# Patient Record
Sex: FEMALE | Race: WHITE
[De-identification: names, ages, dates, MRNs, and addresses within clinical notes are randomized per-mention and may not be internally consistent; named-entity substitution may affect disease eponyms.]

---

## 2021-04-15 DIAGNOSIS — Z23 ENCOUNTER FOR IMMUNIZATION: ICD-10-CM

## 2024-08-20 ENCOUNTER — HOSPITAL ENCOUNTER (INPATIENT)
Dept: HOSPITAL 99 - CVICU | Age: 64
LOS: 12 days | Discharge: HOME | DRG: 219 | End: 2024-09-01
Payer: COMMERCIAL

## 2024-08-20 VITALS
BODY MASS INDEX: 37.7 KG/M2 | BODY MASS INDEX: 38.2 KG/M2 | BODY MASS INDEX: 40 KG/M2 | BODY MASS INDEX: 39.4 KG/M2 | BODY MASS INDEX: 38.4 KG/M2 | BODY MASS INDEX: 37.8 KG/M2 | BODY MASS INDEX: 39.3 KG/M2 | BODY MASS INDEX: 38 KG/M2 | BODY MASS INDEX: 39.2 KG/M2 | BODY MASS INDEX: 37.9 KG/M2

## 2024-08-20 VITALS — SYSTOLIC BLOOD PRESSURE: 156 MMHG | DIASTOLIC BLOOD PRESSURE: 68 MMHG

## 2024-08-20 VITALS — RESPIRATION RATE: 20 BRPM

## 2024-08-20 VITALS — SYSTOLIC BLOOD PRESSURE: 152 MMHG | DIASTOLIC BLOOD PRESSURE: 64 MMHG

## 2024-08-20 VITALS — RESPIRATION RATE: 18 BRPM

## 2024-08-20 DIAGNOSIS — Z79.4: ICD-10-CM

## 2024-08-20 DIAGNOSIS — T82.855A: ICD-10-CM

## 2024-08-20 DIAGNOSIS — E78.00: ICD-10-CM

## 2024-08-20 DIAGNOSIS — I48.0: ICD-10-CM

## 2024-08-20 DIAGNOSIS — I50.33: ICD-10-CM

## 2024-08-20 DIAGNOSIS — D62: ICD-10-CM

## 2024-08-20 DIAGNOSIS — Z82.49: ICD-10-CM

## 2024-08-20 DIAGNOSIS — Z96.41: ICD-10-CM

## 2024-08-20 DIAGNOSIS — J98.11: ICD-10-CM

## 2024-08-20 DIAGNOSIS — E86.1: ICD-10-CM

## 2024-08-20 DIAGNOSIS — I11.0: ICD-10-CM

## 2024-08-20 DIAGNOSIS — E10.40: ICD-10-CM
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
## 2024-08-20 DIAGNOSIS — N39.0: ICD-10-CM

## 2024-08-20 DIAGNOSIS — Z87.891: ICD-10-CM

## 2024-08-20 DIAGNOSIS — G47.33: ICD-10-CM

## 2024-08-20 DIAGNOSIS — Z79.02: ICD-10-CM

## 2024-08-20 DIAGNOSIS — Z95.5: ICD-10-CM

## 2024-08-20 DIAGNOSIS — I25.110: Primary | ICD-10-CM

## 2024-08-20 DIAGNOSIS — I08.0: ICD-10-CM

## 2024-08-20 DIAGNOSIS — J93.83: ICD-10-CM

## 2024-08-20 DIAGNOSIS — D69.59: ICD-10-CM

## 2024-08-20 DIAGNOSIS — I25.5: ICD-10-CM

## 2024-08-20 DIAGNOSIS — I27.20: ICD-10-CM

## 2024-08-20 DIAGNOSIS — E66.01: ICD-10-CM

## 2024-08-20 DIAGNOSIS — I25.2: ICD-10-CM

## 2024-08-20 DIAGNOSIS — Z98.84: ICD-10-CM

## 2024-08-20 DIAGNOSIS — E10.21: ICD-10-CM

## 2024-08-20 DIAGNOSIS — B96.20: ICD-10-CM

## 2024-08-20 DIAGNOSIS — Z79.899: ICD-10-CM

## 2024-08-20 DIAGNOSIS — N17.9: ICD-10-CM

## 2024-08-20 PROCEDURE — P9047 ALBUMIN (HUMAN), 25%, 50ML: HCPCS

## 2024-08-20 PROCEDURE — 88311 DECALCIFY TISSUE: CPT

## 2024-08-20 PROCEDURE — P9016 RBC LEUKOCYTES REDUCED: HCPCS

## 2024-08-20 PROCEDURE — P9073 PLATELETS PHERESIS PATH REDU: HCPCS

## 2024-08-20 PROCEDURE — 88305 TISSUE EXAM BY PATHOLOGIST: CPT

## 2024-08-20 PROCEDURE — P9045 ALBUMIN (HUMAN), 5%, 250 ML: HCPCS

## 2024-08-20 RX ADMIN — GABAPENTIN 800 MG: 400 CAPSULE ORAL at 23:43

## 2024-08-21 VITALS — RESPIRATION RATE: 16 BRPM

## 2024-08-21 VITALS — SYSTOLIC BLOOD PRESSURE: 138 MMHG | RESPIRATION RATE: 18 BRPM | DIASTOLIC BLOOD PRESSURE: 51 MMHG

## 2024-08-21 VITALS — SYSTOLIC BLOOD PRESSURE: 149 MMHG | DIASTOLIC BLOOD PRESSURE: 46 MMHG

## 2024-08-21 VITALS — SYSTOLIC BLOOD PRESSURE: 111 MMHG | DIASTOLIC BLOOD PRESSURE: 86 MMHG

## 2024-08-21 VITALS — DIASTOLIC BLOOD PRESSURE: 51 MMHG | SYSTOLIC BLOOD PRESSURE: 139 MMHG

## 2024-08-21 VITALS — DIASTOLIC BLOOD PRESSURE: 47 MMHG | SYSTOLIC BLOOD PRESSURE: 123 MMHG

## 2024-08-21 VITALS — DIASTOLIC BLOOD PRESSURE: 60 MMHG | SYSTOLIC BLOOD PRESSURE: 114 MMHG

## 2024-08-21 VITALS — RESPIRATION RATE: 20 BRPM

## 2024-08-21 VITALS — SYSTOLIC BLOOD PRESSURE: 144 MMHG | DIASTOLIC BLOOD PRESSURE: 62 MMHG

## 2024-08-21 VITALS — RESPIRATION RATE: 18 BRPM

## 2024-08-21 VITALS — DIASTOLIC BLOOD PRESSURE: 49 MMHG | SYSTOLIC BLOOD PRESSURE: 141 MMHG

## 2024-08-21 VITALS — SYSTOLIC BLOOD PRESSURE: 123 MMHG | DIASTOLIC BLOOD PRESSURE: 47 MMHG

## 2024-08-21 VITALS — SYSTOLIC BLOOD PRESSURE: 140 MMHG | DIASTOLIC BLOOD PRESSURE: 60 MMHG

## 2024-08-21 LAB
ALBUMIN SERPL-MCNC: 3.2 G/DL (ref 3.5–5)
ALP SERPL-CCNC: 109 U/L (ref 38–126)
ALT SERPL-CCNC: 29 U/L (ref 0–35)
APTT PPP: 28.2 SEC (ref 23.4–35)
APTT PPP: 32 SEC (ref 23.4–35)
APTT PPP: 61.6 SEC (ref 23.4–35)
AST SERPL-CCNC: 35 U/L (ref 14–36)
BUN SERPL-MCNC: 15 MG/DL (ref 7–17)
CALCIUM SERPL-MCNC: 9.2 MG/DL (ref 8.4–10.2)
CHLORIDE SERPL-SCNC: 107 MMOL/L (ref 98–107)
CO2 SERPL-SCNC: 25 MMOL/L (ref 22–30)
EGFR: > 60
ERYTHROCYTE [DISTWIDTH] IN BLOOD BY AUTOMATED COUNT: 14.1 % (ref 11.5–14.5)
ERYTHROCYTE [DISTWIDTH] IN BLOOD BY AUTOMATED COUNT: 14.2 % (ref 11.5–14.5)
ESTIMATED CREATININE CLEARANCE: 82 ML/MIN
GLUCOSE - POINT OF CARE: 173 MG/DL (ref 70–99)
GLUCOSE - POINT OF CARE: 193 MG/DL (ref 70–99)
GLUCOSE SERPL-MCNC: 130 MG/DL (ref 70–99)
HBA1C MFR BLD: 7 % (ref 4–5.6)
HCT VFR BLD AUTO: 29.4 % (ref 37–47)
HCT VFR BLD AUTO: 32.6 % (ref 37–47)
HDLC SERPL-MCNC: 33 MG/DL
HGB BLD-MCNC: 10 G/DL (ref 12–16)
HGB BLD-MCNC: 11.3 G/DL (ref 12–16)
INR PPP: 1.06
LDLC SERPL CALC-MCNC: 31 MG/DL
MAGNESIUM SERPL-MCNC: 1.8 MG/DL (ref 1.6–2.3)
MCHC RBC AUTO-ENTMCNC: 34 G/DL (ref 33–37)
MCHC RBC AUTO-ENTMCNC: 34.7 G/DL (ref 33–37)
MCV RBC AUTO: 86.2 FL (ref 81–99)
MCV RBC AUTO: 86.2 FL (ref 81–99)
PLATELET # BLD AUTO: 169 10^3/UL (ref 130–400)
PLATELET # BLD AUTO: 172 10^3/UL (ref 130–400)
POTASSIUM SERPL-SCNC: 3.9 MMOL/L (ref 3.5–5.1)
PROT SERPL-MCNC: 5.3 G/DL (ref 6.3–8.2)
PROTHROMBIN TIME: 13.6 SEC (ref 11.4–14.6)
SODIUM SERPL-SCNC: 137 MMOL/L (ref 135–145)
URINE RED BLOOD CELL: (no result) /HPF (ref 0–2)
URINE WHITE CELL: (no result) /HPF (ref 0–5)
VLDLC SERPL CALC-MCNC: 13 MG/DL (ref 0–30)

## 2024-08-21 RX ADMIN — SOTALOL HYDROCHLORIDE 40 MG: 80 TABLET ORAL at 21:23

## 2024-08-21 RX ADMIN — SOTALOL HYDROCHLORIDE 40 MG: 80 TABLET ORAL at 09:03

## 2024-08-21 RX ADMIN — ACETAMINOPHEN 650 MG: 325 TABLET ORAL at 17:04

## 2024-08-21 RX ADMIN — EZETIMIBE 10 MG: 10 TABLET ORAL at 17:06

## 2024-08-21 RX ADMIN — GABAPENTIN 900 MG: 300 CAPSULE ORAL at 21:23

## 2024-08-21 RX ADMIN — PANTOPRAZOLE SODIUM: 40 TABLET, DELAYED RELEASE ORAL at 09:06

## 2024-08-21 RX ADMIN — ISOSORBIDE MONONITRATE 60 MG: 60 TABLET, EXTENDED RELEASE ORAL at 09:05

## 2024-08-21 RX ADMIN — HEPARIN SODIUM 250: 10000 INJECTION, SOLUTION INTRAVENOUS at 16:26

## 2024-08-21 RX ADMIN — ATORVASTATIN CALCIUM 40 MG: 40 TABLET, FILM COATED ORAL at 17:06

## 2024-08-21 RX ADMIN — Medication 50 MCG: at 09:05

## 2024-08-21 RX ADMIN — ASPIRIN 81 MG: 81 TABLET, COATED ORAL at 09:03

## 2024-08-21 RX ADMIN — HYDRALAZINE HYDROCHLORIDE 5 MG: 20 INJECTION INTRAMUSCULAR; INTRAVENOUS at 00:49

## 2024-08-21 RX ADMIN — GABAPENTIN 900 MG: 300 CAPSULE ORAL at 00:48

## 2024-08-21 NOTE — PTCARENOTE
"Pt. received as transfer from [facility] into room 9654.  EMS reported that pt. complained of CP 3/10 during transport and recieved 4 baby aspirin and 1 SL nitro.  EKG obtained in ambulance read NSR.  Upon arrival, pt. reported CP had resolved.  "~"Patient placed on tele monitor, reading NSR/sinus james with HR 50s-60s. Admission completed and pt oriented to room.  Pt. ambulating independently in room without difficulty.  Plan of care discussed and pt. verbalizes understanding.  Can make needs "~"known.  Call bell within reach."

## 2024-08-21 NOTE — CM
"Reviewed chart. Met with Mrs. Givens to review discharge plans.  She states prior to admission she resides with her spouse in a spilt level home without any steps to enter. She states she has four steps to get to to the main living area and then ten "~"steps to get to bedroom/full bathroom.  She states prior to admission she was independent with ambulation and adls.  She states she does not have any DME in the home. She states she has a prescription plan. She states her spouse will be home to "~"assist in her care if needed.  Medical work-up in progress.  The discharge plan is to return home with her spouse when medically stable. "

## 2024-08-21 NOTE — PN.DE.MGMTRT
"Insulin Management"~"- -"~"-: "~"8/21/2024: Diabetes Management Consult"~"65 y/o pleasant woman who was transferred from [facility] with hx of MI/CAD S/P PCI stent to LAD in June 2024. Pt was in Cardiac rehab @ [facility] on Friday 8/16/24 when she developed 10/10 substernal chest pain with radiation to the neck/jaw--&gt; Cardiac cath--&gt; "~"extensive LAD disease--&gt; refereed to  for CABG."~"PMH: HTN, CAD/MI s/p PCI with VANESA to LAD, PAF S/P ablation, 2021, PSVT S/P ablation 2021, HLD, IDDMx 45 years and using the insulin pump x35 yrs, Diabetic neuropathy and Class 2 obesity (BMI 37.6). Routinely sees Endo @ [facility] Dr. Johnson"~"Currently using Medtronic 780 G Insulin pump with Guardian Sensor and NovoLog insulin."~"Pt awake, A/O x3, offers no complaints, able to discuss diabetes management. "~"Current glucose via sensor is 116. Pt has placed her lunch order and is waiting for her tray"~"Was notified by CV team that Pt was very reluctant about getting accucheks done."~"Upon discussion, pt became very emotional, stating that she has been through a lot these past 4 days and wants to have some type of control over her life."~"Gently explained to pt the process of using her own pump while here in the hospital, reviewed bedside worksheet and need for Nursing staff to check her blood sugars ACHS. "~"Current pump settings"~"Basal 			ICR		ISF 12am - 12am 1:40"~"12am - 2am  1.80		12am - 7am   6"~"2am - 8pm    1.60		7am - 5pm     5	Target 90to 110"~"8pm - 12am   1.75  		5pm - 12am    5"~"24 hour total 39.4 units"~"Explained to pt that in the event that she has been schedule for CABG, the insulin pump will be discontinued the morning of surgery and her glucose will be managed using the continuous insulin drip x48 hrs after surgery and she was agreeable."~"Pt states that she does not use her basal settings when pump is in auto mode and that she only corrects and boluses at meal times"~"Will change die to 1600cal, pt is only 5'1"~"Diabetes History"~"- -"~"Type of Diabetes: 2 requiring insulin"~"-: "~"Pre-Admission Diabetes Regimen"~" 	08/21/24"~" 	05:47"~"Creatinine	 0.7"~"Lab Results"~"Hemoglobin A1c	 7.0 % (4.0-5.6)  H 	08/21/24  05:47    "~"Insulin Pump Settings"~"IP Diabetes Regimen"~" 	08/21/24"~" 	05:47"~"Glucose	 130 H"~"Meal type:                    	Breakfast                                       	"~"Meal type:                    	Breakfast                                       	"~"Amount consumed:              	100%                                            	"~"Amount consumed:              	100%                                            	"~"Patient Education"

## 2024-08-21 NOTE — DOWNTIME
"There was a [employer] Client  Downtime on 8/21/2024 from 0100 to 8/21/2024 at 0252. Downtime documentation of patient's care, including medication administrations, has been reconciled in the electronic record per guidelines. Refer to the "~"patient's paper chart under the miscellaneous tab to see printed paper medication records and downtime forms."

## 2024-08-21 NOTE — HPS.HSE
"Addendum entered and electronically signed by DENNYS Mahajan  08/22/24 11:08: "~"Past Surgical History:"~"Gastric bypass ([place]-en-Y)"~"Original Note:"~"Family Physician"~"-"~"-: "~"Family Physician:  Gama Hi"~"Chief Complaint"~"-"~"-: "~"Chest pain"~"History of Present Illness"~"-: "~"63 y/o pleasant woman who was transferred from [facility] with hx of MI/CAD S/P PCI stent to LAD in June 2024. Pt was in Cardiac rehab @ [facility] on Friday 8/16/24 when she developed 10/10 substernal chest pain with radiation to the neck/jaw. Denies associated "~"SOB/diaphoresis, N/V. States she received SL NTG x3 without relief and was referred to [facility]. States pain finally resolved after receiving morphine in the ED.  States troponin and EKG were unrevealing, but given her hx of CAD it was felt prudent "~"that she received a cath. Left heart catheterization on Monday 8/18/24 revealed critical and extensively calcified mid left anterior descending as culprit vessel. Pt also had an echocardiogram which showed a moderate AS and moderately elevated "~"pulmonary artery pressure, EF 65-70%. Pt was referred to  for evaluation of CABG (MIDCAB). Of note, pt is on Plavix for previous stent, last dose was Monday 8/18/2024. "~"Medical History"~"Past Medical History"~"Past Medical History: Reports Other"~"Additional Past Medical History: "~"-Hx MI/CAD S/P PCI with VANESA to LAD"~"-[place]"~"-T2DM since age of 19, on insulin with hgb A1C of  7.2         "~"-Diabetic neuropathy       "~"-PAF S/P ablation, 2021"~"-PSVT S/P ablation, 2021"~"-HTN"~"-HLD"~"-Class 2 obesity (BMI 37.6)"~"Past Surgical History: Reports Other"~"Additional Past Surgical History: "~"-S/P b/l cataracts"~"-S/p Repair of retinal detachment, 2021"~"-S/P b/l carpal tunnel "~"-S/p Cholecystectomy "~"-S/P Appendectomy"~"-S/P Left TKA "~"-S/p Release of trigger finger "~"Social History"~"Unable to obtain full social history at this time due to: Other"~"Tobacco: Smoker (quit 35 years ago)"~"Alcohol: Occasional"~"Drug: None"~"Personal:  (with 2 children)"~"Living: With Family"~"Employment: Disabled (x 20 yrs)"~"Family History"~"Family History: CAD (father had CABG is living @ age of 101) and Other"~"Allergies / Home Medications"~"-: "~"Allergies reflects when Allergies were last updated in [employer]."~"Home Medications with original date entered in [employer] "~"Allergy/Medication List: "~"Adhesive tape (hives)"~"Fish oil (unknown)"~"Review of Systems"~"-"~"Unable to obtain full review of systems at this time due to: Other"~"A 12 point ROS was completed and negative except as noted: Yes"~"Cardiac: Reports Chest Pain"~"Physical Exam"~"Vital Signs"~"-: "~"Vital Signs"~"Temp	Pulse	Resp	BP	Pulse Ox"~" 97.8 F 	 61 	 18 	 138/51 	 95 "~" 08/21/24 05:50	 08/21/24 05:50	 08/21/24 05:50	 08/21/24 05:50	 08/21/24 05:50"~"Physical Exam"~"General: Well Developed and Well Nourished"~"HEENT: NormoCephalic, Moist mucous membranes, Atraumatic, Pink Conjunctivae and No Ptosis"~"Respiratory: Clear"~"Cardiac: S1/S2 and Regular Rhythm"~"Breast: Deferred by me"~"GI: Soft, Non Tender, Non Distended and Normal Bowel Sounds"~"Rectal: Deferred by Provider"~"Genito-urinary: Deferred by me"~"Skin: Warm"~"Neuro: Awake, Alert, Oriented and AO x 3"~"Psych: Calm"~"Laboratory Results"~"-"~"-: "~"08/21/24 05:47 "~"08/21/24 05:47 "~"Laboratory Results"~"PT	 13.6 Sec (11.4-14.6)  	08/21/24  05:47    "~"INR	 1.06  	08/21/24  05:47    "~"APTT	 32.0 Sec (23.4-35.0)  	08/21/24  05:47    "~"Total Bilirubin	 0.8 mg/dl (0.2-1.3)  	08/21/24  05:47    "~"AST	 35 U/L (14-36)  	08/21/24  05:47    "~"ALT	 29 U/L (0-35)  	08/21/24  05:47    "~"Alkaline Phosphatase	 109 U/L ()  	08/21/24  05:47    "~"Data Reviewed"~"-"~"Diagnostic Radiology: Image Personally Visualized and interpreted and Report Reviewed by me"~"Lab Data: Labs Reviewed by me"~"Old Records: Requested and Reviewed"~"Impression/Plan"~"-"~"-: "~"IMPRESSION:"~"63 y/o pleasant woman with severe LAD disease referred from [facility]  for CABG evaluation. Of note pt's echo showed moderate AS, and was administered Plavix on 8/18/24"~"PLAN: "~"-CABG evaluation"~"-Dr. Gunter to review imaging and see pt"~"-Pt will need Plavix washout"

## 2024-08-21 NOTE — PTCARENOTE
"Assumed care of pt from prev nsg shift; Pt had just arrived back on the floor from having testing completed. Pt AAOx3 w/no c/o CP or SOB. Pt able to ambulate unassisted from stretcher to chair. This RN discussed pt's insulin pump & checking glucose "~"levels. Pt unhappy with the need for fingerstick glucose checks when 'her meter keeps track'. Pt also doesn't keep track of her insulin dosing because 'the meter and the pump take care of that for me'. This RN spoke w/CT surgery PA & rec'd orders "~"for pt's own insulin pump & the diabetic educator to see pt today.  Discussed w/pt & pt willing to speak w/educator. Pt's VS stable w/HR in the 60's & /51. Pt is SR on telemetry monitoring. Plan of care ongoing."

## 2024-08-22 VITALS — SYSTOLIC BLOOD PRESSURE: 119 MMHG | DIASTOLIC BLOOD PRESSURE: 57 MMHG

## 2024-08-22 VITALS — SYSTOLIC BLOOD PRESSURE: 127 MMHG | DIASTOLIC BLOOD PRESSURE: 47 MMHG

## 2024-08-22 VITALS — RESPIRATION RATE: 18 BRPM

## 2024-08-22 VITALS — SYSTOLIC BLOOD PRESSURE: 118 MMHG | DIASTOLIC BLOOD PRESSURE: 47 MMHG

## 2024-08-22 VITALS — SYSTOLIC BLOOD PRESSURE: 113 MMHG | DIASTOLIC BLOOD PRESSURE: 51 MMHG

## 2024-08-22 VITALS — DIASTOLIC BLOOD PRESSURE: 45 MMHG | SYSTOLIC BLOOD PRESSURE: 131 MMHG

## 2024-08-22 VITALS — SYSTOLIC BLOOD PRESSURE: 147 MMHG | DIASTOLIC BLOOD PRESSURE: 58 MMHG

## 2024-08-22 LAB
APTT PPP: 74 SEC (ref 23.4–35)
APTT PPP: 90.2 SEC (ref 23.4–35)
ERYTHROCYTE [DISTWIDTH] IN BLOOD BY AUTOMATED COUNT: 14.2 % (ref 11.5–14.5)
GLUCOSE - POINT OF CARE: 121 MG/DL (ref 70–99)
GLUCOSE - POINT OF CARE: 134 MG/DL (ref 70–99)
GLUCOSE - POINT OF CARE: 135 MG/DL (ref 70–99)
GLUCOSE - POINT OF CARE: 144 MG/DL (ref 70–99)
GLUCOSE - POINT OF CARE: 181 MG/DL (ref 70–99)
HCT VFR BLD AUTO: 32 % (ref 37–47)
HDLC SERPL-MCNC: 38 MG/DL
HGB BLD-MCNC: 10.8 G/DL (ref 12–16)
LDLC SERPL CALC-MCNC: 35 MG/DL
MCHC RBC AUTO-ENTMCNC: 33.8 G/DL (ref 33–37)
MCV RBC AUTO: 88.9 FL (ref 81–99)
PLATELET # BLD AUTO: 165 10^3/UL (ref 130–400)
VLDLC SERPL CALC-MCNC: 11 MG/DL (ref 0–30)

## 2024-08-22 RX ADMIN — Medication 50 MCG: at 08:32

## 2024-08-22 RX ADMIN — ATORVASTATIN CALCIUM 40 MG: 40 TABLET, FILM COATED ORAL at 18:35

## 2024-08-22 RX ADMIN — EZETIMIBE 10 MG: 10 TABLET ORAL at 18:35

## 2024-08-22 RX ADMIN — SOTALOL HYDROCHLORIDE 40 MG: 80 TABLET ORAL at 19:41

## 2024-08-22 RX ADMIN — GABAPENTIN 900 MG: 300 CAPSULE ORAL at 22:36

## 2024-08-22 RX ADMIN — HEPARIN SODIUM 250: 10000 INJECTION, SOLUTION INTRAVENOUS at 13:44

## 2024-08-22 RX ADMIN — ASPIRIN 81 MG: 81 TABLET, COATED ORAL at 08:32

## 2024-08-22 RX ADMIN — SOTALOL HYDROCHLORIDE 40 MG: 80 TABLET ORAL at 08:31

## 2024-08-22 RX ADMIN — ISOSORBIDE MONONITRATE 60 MG: 60 TABLET, EXTENDED RELEASE ORAL at 08:32

## 2024-08-22 RX ADMIN — PANTOPRAZOLE SODIUM: 40 TABLET, DELAYED RELEASE ORAL at 08:31

## 2024-08-22 NOTE — PN.DE.MGMTRT
"Insulin Management"~"- -"~"-: "~"8/22/2024: Diabetes Management Consult Follow up"~"63 y/o pleasant woman who was transferred from [facility] with hx of MI/CAD S/P PCI stent to LAD in June 2024. Pt was in Cardiac rehab @ [facility] on Friday 8/16/24 when she developed 10/10 substernal chest pain with radiation to the neck/jaw--&gt; Cardiac cath--&gt; "~"extensive LAD disease--&gt; refereed to  for CABG."~"PMH: HTN, CAD/MI s/p PCI with VANESA to LAD, PAF S/P ablation, 2021, PSVT S/P ablation 2021, HLD, IDDMx 45 years and using the insulin pump x35 yrs, Diabetic neuropathy and Class 2 obesity (BMI 37.6). Routinely sees Endo @ [facility] Dr. Johnson"~"Currently using Medtronic 780 G Insulin pump with Guardian Sensor and NovoLog insulin and [place] advanced infusion set."~"Pt awake, A/O x3, offers no complaints, oob visiting with son, able to discuss diabetes management. "~"Initially patient was reluctant to POC testing, preferred to rely only on her sensor.  Discussed at length the difference between sensor and fingerstick.  Her concern is her sensor is sometimes 30 points different from the POC.  Reassured that there "~"may be up to a 25% allowable difference between the two."~"She is doing well using her own pump while here in the hospital, reviewed bedside worksheet. "~"Current pump settings"~"Basal 			ICR		ISF 12am - 12am 1:40"~"12am - 2am  1.80		12am - 7am   6"~"2am - 8pm    1.60		7am - 5pm     5	Target 90to 110"~"8pm - 12am   1.75  		5pm - 12am    5"~"24 hour total 39.4 units"~"Glucose range 130 to 193 yesterday, patient did take corrections, glucose 121 fasting this AM.  Will make no change to pump settings."~"Patient for CABG 8/26,  she is aware the insulin pump will be discontinued the morning of surgery and her glucose will be managed using the continuous insulin drip during surgery x48 hrs after surgery and she was agreeable."~"Will change die to 1600cal, pt is only 5'1"~"Diabetes History"~"- -"~"Type of Diabetes: 1"~"-: "~"Pre-Admission Diabetes Regimen"~"Lab Results"~"Hemoglobin A1c	 7.0 % (4.0-5.6)  H 	08/21/24  05:47    "~"Insulin Pump Settings"~"IP Diabetes Regimen"~" 	08/21/24	08/21/24	08/22/24"~" 	17:59	23:01	05:33"~"POC Glucose	 173 H	 193 H	 134 H"~" 	08/22/24"~" 	07:51"~"POC Glucose	 121 H"~"Meal type:                    	Lunch                                           	"~"Meal type:                    	Breakfast                                       	"~"Meal type:                    	Breakfast                                       	"~"Amount consumed:              	75%                                             	"~"Amount consumed:              	100%                                            	"~"Amount consumed:              	100%                                            	"~"Patient Education"

## 2024-08-22 NOTE — PTCARENOTE
Received patient for the night in the chair. Heparin infusing at 1200 units/hr. SR on the monitor. No complaints of chest pain. Ambulating in room. Call bell within reach.

## 2024-08-22 NOTE — PTCARENOTE
"Pt. remains NSR/sinus james on tele with HR 50s-60s.  VSS. Pt. denies chest pain and SOB.  Heparin gtt currently infusing at 12ml/hr.  R radial cath site dry/intact with ecchymosis noted.  Ambulating independently in room without difficulty.  Plan "~"of care discussed and patient verblizes understanding. Call bell within reach."

## 2024-08-22 NOTE — W.PN.CT
"Today's Communication / Plan"~"-"~"-: "~"-no issues overnight, no CP"~"-Plavix washout "~"-plans for AVR/CABG/maze/clip on Monday 8/26 by Dr. Gunter"~"-iv Heparin renewed"~"-workup ongoing"~"Assessment / Plan"~"-"~"-: "~"-Unstable angina at Cardiac rehab @ [facility] on Friday 8/16/24- no relief with sl Nitro, improved with Morphine"~"	-Left heart catheterization on Monday 8/18/24 revealed critical and extensively calcified mid left anterior descending as culprit vessel."~"	-Echocardiogram revealed a moderate AS and moderately elevated pulmonary artery pressure, EF 65-70%"~"	-on Plavix for recent stent, last dose on Monday 8/19/24"~"	-transferred to  on 8/20 for evaluation for CABG and possibly AVR"~"-Hx MI/CAD S/P PCI with VANESA to LAD in June 2024"~"-[place]"~"-T2DM since age of 19, on insulin with hgb A1C of  7.2         "~"-Diabetic neuropathy       "~"-PAF S/P ablation, 2021"~"-PSVT S/P ablation, 2021"~"-HTN"~"-HLD"~"-Class 2 obesity (BMI 37.6)"~"-S/P b/l cataracts"~"-S/p Repair of retinal detachment, 2021"~"-S/P b/l carpal tunnel "~"-S/p Cholecystectomy "~"-S/P Appendectomy"~"-S/P Left TKA "~"-S/p Release of trigger finger "~"-Gastric bypass noted on CT"~"Discussed patient care with: Nursing and Care Team"~"Subjective"~"-"~"-: "~"Date of Service:  August 22, 2024"~"Objective Data"~"-"~"-: "~"Lab Results"~"08/22/24 05:29 "~"08/21/24 05:47 "~"PT	 13.6 Sec (11.4-14.6)  	08/21/24  05:47    "~"INR	 1.06  	08/21/24  05:47    "~"APTT	 90.2 Sec (23.4-35.0)  H 	08/22/24  05:29    "~"Vital Signs"~"-: "~"Vital Signs"~"Temp	Pulse	Resp	BP	Pulse Ox"~" 97.9 F 	 55 	 18 	 118/47 	 97 "~" 08/22/24 05:17	 08/22/24 05:18	 08/22/24 05:17	 08/22/24 05:18	 08/22/24 05:17"~"CT Intake/Output/Weight"~"	08/21/24	08/21/24	08/22/24"~"	06:59	18:59	06:59"~"Intake Total		240 / 240	"~"Balance		240 / 240	"~"SaO2: 97"~"Physical Exam"~"-"~"General: Awake and AOx3"~"Cardiovascular: Regular rate & rhythm, Murmur (2/6 systolic @ lsb) and No Rub"~"Respiratory: Decreased Breath Sounds"~"Extremities: No Edema"~"Data Reviewed"~"-"~"Lab Results: Results Reviewed"~"Medications: Active Meds Reviewed"~"Chest X-Ray: Report Reviewed and Image Reviewed"~"ECG: Report Reviewed and Image Reviewed"

## 2024-08-22 NOTE — CM
"Reviewed chart. Met with Mrs. Givens to review discharge plans.  She states prior to admission she resides with her spouse in a spilt level home without any steps to enter. She has four steps to get to the main living area and then ten steps to get "~"to bedroom/full bathroom. Prior to admission she was independent with ambulation and adls.  She does not have any DME in the home.  She has a prescription plan. Her spouse will be home to assist in her care if needed.   Medical work-up in progress.  "~"The discharge plan is to return home with her spouse and a home visit by the Cardiothoracic Transitional Care Nurse when medically stable.  "~"We reviewed pre-op and post-op routines.  We briefly reviewed the shower instructions.  She already has the Cardiac Surgery Educational Booklet.  We reviewed restrictions including sternal precautions and driving restrictions.  We discussed a home "~"visit by the Cardiothoracic Transitional Care Nurse.  She is agreeable to a home visit.  The plan is for CABG/AVR on Monday, August 26, 2024. "

## 2024-08-22 NOTE — W.PN.CD
"Today's Communication / Plan"~"-"~"-: "~"ok to shower off UFH"~"OHS after plavix washout"~"Impression / Plan"~"-"~"-: "~"CAD:"~"	-severe, with hx multiple stents (most recent June 2024), now requiring surgery. Plavix being washed out."~"	-continue ASA and statin"~"	-I reviewed angio and older films from April and June 2024. There is significant disease in prox and mid LAD, ostial cx, ramus which has ostial and distal stents both occluded, and ostial RCA (ISR from April 2024 stent procedure). I think she "~"should undergo as complete a revasc as possible. This would most likely mean distal LAD, either D1 or mid LAD, LPL and RCA. "~"Aortic stenosis:"~"	-moderate by echo report and exam"~"	-Strong consideration to AVR at time of CABG"~"Type I DM:"~"	-on insulin pump"~"	-diabetic NP consulted"~"PAF:"~"	-hx PVI"~"	-on sotalol as OP- continue- follow telemetry, baseline EKG acceptable QTC"~"	-Xarelto held for surgery- resume OAC when safe post-op. Last dose was Friday per patient. [id number] score is at least 4 for age, DM, CAD, and female. Currently on UFH"~"Dyslipidemia:"~"	-check lipids"~"	-continue atorvastatin and ezetimibe"~"TONO:"~"	-hasn't been on CPAP after hers was recalled"~"	-this should be reassessed in future"~"Physical Exam"~"Vital Signs/Labs"~"-: "~"Vital Signs"~"Temp	Pulse	Resp	BP	Pulse Ox"~" 97.5 F 	 55 	 18 	 118/47 	 97 "~" 08/22/24 06:59	 08/22/24 05:18	 08/22/24 06:59	 08/22/24 05:18	 08/22/24 06:59"~"	08/21/24	08/22/24	08/23/24"~"	06:59	06:59	06:59"~"Actual Weight	200 lb 2.876 oz	200 lb 9.93 oz	"~"08/22/24 05:29 "~"08/21/24 05:47 "~"PT	 13.6 Sec (11.4-14.6)  	08/21/24  05:47    "~"INR	 1.06  	08/21/24  05:47    "~"APTT	 90.2 Sec (23.4-35.0)  H 	08/22/24  05:29    "~"Magnesium	 1.8 mg/dl (1.6-2.3)  	08/21/24  05:47    "~"Triglycerides	 58 mg/dl ()  	08/22/24  05:29    "~"LDL Cholesterol, Calc	 35 mg/dl 	08/22/24  05:29    "~"VLDL Cholesterol, Calc	 11 mg/dl (0-30)  	08/22/24  05:29    "~"HDL Cholesterol	 38 mg/dl 	08/22/24  05:29    "~"Physical Exam"~"Constitutional: No acute distress and Comfortable"~"Cardiovascular: Rhythm & rate is regular and Systolic murmur present (2/6 mid peaking AS murmur)"~"Respiratory: Respiratory effort normal, Lungs clear to auscul., Wheeze Absent and Crackles Absent"~"GI: Soft and Non tender"~"Neuro/Psych: AO x 3 and Motor deficits absent"~"Data Reviewed"~"-"~"-: "~"Date of Service:  August 22, 2024"

## 2024-08-23 VITALS — SYSTOLIC BLOOD PRESSURE: 149 MMHG | DIASTOLIC BLOOD PRESSURE: 67 MMHG

## 2024-08-23 VITALS — DIASTOLIC BLOOD PRESSURE: 80 MMHG | SYSTOLIC BLOOD PRESSURE: 95 MMHG

## 2024-08-23 VITALS — SYSTOLIC BLOOD PRESSURE: 117 MMHG | DIASTOLIC BLOOD PRESSURE: 65 MMHG

## 2024-08-23 VITALS — DIASTOLIC BLOOD PRESSURE: 52 MMHG | SYSTOLIC BLOOD PRESSURE: 145 MMHG

## 2024-08-23 VITALS — SYSTOLIC BLOOD PRESSURE: 157 MMHG | DIASTOLIC BLOOD PRESSURE: 74 MMHG

## 2024-08-23 VITALS — RESPIRATION RATE: 18 BRPM

## 2024-08-23 VITALS — SYSTOLIC BLOOD PRESSURE: 130 MMHG | DIASTOLIC BLOOD PRESSURE: 72 MMHG

## 2024-08-23 VITALS — RESPIRATION RATE: 16 BRPM

## 2024-08-23 LAB
APTT PPP: 133.8 SEC (ref 23.4–35)
APTT PPP: 50.2 SEC (ref 23.4–35)
APTT PPP: 92.4 SEC (ref 23.4–35)
ERYTHROCYTE [DISTWIDTH] IN BLOOD BY AUTOMATED COUNT: 14 % (ref 11.5–14.5)
GLUCOSE - POINT OF CARE: 109 MG/DL (ref 70–99)
GLUCOSE - POINT OF CARE: 121 MG/DL (ref 70–99)
GLUCOSE - POINT OF CARE: 156 MG/DL (ref 70–99)
GLUCOSE - POINT OF CARE: 231 MG/DL (ref 70–99)
HCT VFR BLD AUTO: 30.1 % (ref 37–47)
HGB BLD-MCNC: 10.3 G/DL (ref 12–16)
MCHC RBC AUTO-ENTMCNC: 34.2 G/DL (ref 33–37)
MCV RBC AUTO: 89.1 FL (ref 81–99)
PLATELET # BLD AUTO: 142 10^3/UL (ref 130–400)

## 2024-08-23 RX ADMIN — SOTALOL HYDROCHLORIDE 40 MG: 80 TABLET ORAL at 08:43

## 2024-08-23 RX ADMIN — ISOSORBIDE MONONITRATE 60 MG: 60 TABLET, EXTENDED RELEASE ORAL at 08:42

## 2024-08-23 RX ADMIN — HEPARIN SODIUM 250: 10000 INJECTION, SOLUTION INTRAVENOUS at 12:46

## 2024-08-23 RX ADMIN — Medication 50 MCG: at 08:42

## 2024-08-23 RX ADMIN — SOTALOL HYDROCHLORIDE 40 MG: 80 TABLET ORAL at 19:36

## 2024-08-23 RX ADMIN — PANTOPRAZOLE SODIUM: 40 TABLET, DELAYED RELEASE ORAL at 08:42

## 2024-08-23 RX ADMIN — ASPIRIN 81 MG: 81 TABLET, COATED ORAL at 08:42

## 2024-08-23 RX ADMIN — PANTOPRAZOLE SODIUM: 40 TABLET, DELAYED RELEASE ORAL at 08:46

## 2024-08-23 RX ADMIN — AMOXICILLIN AND CLAVULANATE POTASSIUM 1 TABLET: 500; 125 TABLET, FILM COATED ORAL at 16:07

## 2024-08-23 RX ADMIN — EZETIMIBE 10 MG: 10 TABLET ORAL at 17:39

## 2024-08-23 RX ADMIN — ATORVASTATIN CALCIUM 40 MG: 40 TABLET, FILM COATED ORAL at 17:39

## 2024-08-23 RX ADMIN — GABAPENTIN 900 MG: 300 CAPSULE ORAL at 22:10

## 2024-08-23 NOTE — PTCARENOTE
Per AccuCheck of 135, patient states that she is not giving a insulin bolus. Insulin pump still intact.

## 2024-08-23 NOTE — PTCARENOTE
"Assumedcare of pt from night RN.  Pt received awake and alert, sitting up in chair.  VSS, CM shows NSR 50-60's, POX 97% on RA.  Heparin restarted at 0730 at 1000 units/hr infusing through LH.    Pt managing own insulin injections through own pump.  "~"She offers no c/o pain or discomfort at this time.  For CV SX on Monday."

## 2024-08-23 NOTE — W.PN.CD
"Today's Communication / Plan"~"-"~"-: "~"doing well, euvolemic on exam"~"Impression / Plan"~"-"~"-: "~"CAD, pending CABG/AVR/MAZE Monday 8/26:"~"	-severe CAD with hx multiple stents (most recent June 2024), now requiring surgery. Plavix being washed out"~"	-continue ASA and statin"~"Aortic stenosis:"~"	-moderate by echo report and exam"~"	-AVR Monday"~"HFpEF, ICM / valvular CM"~"	- holding home lasix, diuresis PRN; appears euvolemic today"~"	- holding home ARM periop, resume post-op as appropriate"~"	- continue home isosorbide"~"Type I DM:"~"	-A1c 7.0"~"	-on insulin pump"~"	-diabetic NP consulted"~"PAF:"~"	-hx PVI"~"	-on sotalol as OP- continue- follow telemetry, baseline EKG acceptable QTC"~"	-Xarelto held for surgery- resume OAC when safe post-op. Last dose was Friday per patient. [id number] score is at least 4 for age, DM, CAD, and female. Currently on UFH"~"Dyslipidemia:"~"	-LDL 35"~"	-continue atorvastatin and ezetimibe"~"TONO:"~"	-hasn't been on CPAP after hers was recalled"~"	-this should be reassessed in future"~"Physical Exam"~"Vital Signs/Labs"~"-: "~"Vital Signs"~"Temp	Pulse	Resp	BP	Pulse Ox"~" 36.3 C 	 57 	 16 	 149/67 	 97 "~" 08/23/24 07:15	 08/23/24 07:11	 08/23/24 07:15	 08/23/24 07:11	 08/23/24 07:15"~"	08/22/24	08/23/24	08/24/24"~"	06:59	06:59	06:59"~"Actual Weight	91 kg	91.6 kg	"~"08/23/24 05:32 "~"08/21/24 05:47 "~"PT	 13.6 Sec (11.4-14.6)  	08/21/24  05:47    "~"INR	 1.06  	08/21/24  05:47    "~"APTT	 133.8 Sec (23.4-35.0)  H 	08/23/24  05:32    "~"Magnesium	 1.8 mg/dl (1.6-2.3)  	08/21/24  05:47    "~"Triglycerides	 58 mg/dl ()  	08/22/24  05:29    "~"LDL Cholesterol, Calc	 35 mg/dl 	08/22/24  05:29    "~"VLDL Cholesterol, Calc	 11 mg/dl (0-30)  	08/22/24  05:29    "~"HDL Cholesterol	 38 mg/dl 	08/22/24  05:29    "~"Physical Exam"~"Constitutional: No acute distress, Comfortable and Confusion"~"Cardiovascular: Rhythm & rate is regular, Pedal edema is absent, JVD pressure is normal and Systolic murmur present (2/6 AS murmer)"~"Respiratory: Respiratory effort normal and Lungs clear to auscul."~"Neuro/Psych: Alert, Oriented and AO x 3"~"Data Reviewed"~"-"~"-: "~"Date of Service:  August 23, 2024"~"Medical Decision Making: Reviewed Test Results"~"EKG: Report Reviewed by me"~"Echo: Report Reviewed by me"~"X-Ray/CT/US/MRI/NUC/PET: Report Reviewed by me"~"Medical Tests (PFT, Pathology etc): Report Reviewed by me"~"Labs: Labs Reviewed by me"

## 2024-08-23 NOTE — PN.DE.MGMTRT
"Insulin Management"~"- -"~"-: "~"8/23/2024: Diabetes Management F/U:"~"65 y/o pleasant woman who was transferred from [facility] with hx of MI/CAD S/P PCI stent to LAD in June 2024. Pt was in Cardiac rehab @ [facility] on Friday 8/16/24 when she developed 10/10 substernal chest pain with radiation to the neck/jaw--&gt; Cardiac cath--&gt; "~"extensive LAD disease--&gt; refereed to  for CABG."~"PMH: HTN, CAD/MI s/p PCI with VANESA to LAD, PAF S/P ablation, 2021, PSVT S/P ablation 2021, HLD, IDDMx 45 years and using the insulin pump x35 yrs, Diabetic neuropathy and Class 2 obesity (BMI 37.6). Routinely sees Endo @ [facility] Dr. Johnson"~"Currently using Medtronic 780 G Insulin pump with Guardian Sensor and NovoLog insulin and Vu advanced infusion set."~"Pt awake, A/O x3, offers no complaints, sitting up in bed, able to discuss diabetes management. "~"Initially patient was reluctant to POC testing, preferred to rely only on her sensor.  Discussed at length the difference between sensor and fingerstick.  Her concern is her sensor is sometimes 30 points different from the POC.  Reassured that there "~"may be up to a 25% allowable difference between the two."~"She is doing well using her own pump while here in the hospital, reviewed bedside worksheet. "~"Current pump settings"~"Basal 			ICR		ISF 12am - 12am 1:40"~"12am - 2am  1.80		12am - 7am   6"~"2am - 8pm    1.60		7am - 5pm     5	Target 90to 110"~"8pm - 12am   1.75  		5pm - 12am    5"~"24 hour total 39.4 units"~"Glucose range 121 to 181 yesterday, patient did take corrections, glucose 156 fasting this AM.  "~"Will make no change to pump settings. Patient for CABG 8/26,  she is aware the insulin pump will be discontinued the morning of surgery and her glucose will be managed using the continuous insulin drip during surgery x48 hrs after surgery and she "~"was agreeable."~"Diabetes History"~"- -"~"Type of Diabetes: 2 requiring insulin"~"-: "~"Pre-Admission Diabetes Regimen"~"Lab Results"~"Hemoglobin A1c	 7.0 % (4.0-5.6)  H 	08/21/24  05:47    "~"Insulin Pump Settings"~"IP Diabetes Regimen"~" 	08/22/24	08/22/24	08/22/24"~" 	13:38	17:53	22:42"~"POC Glucose	 181 H	 144 H	 135 H"~" 	08/23/24"~" 	07:54"~"POC Glucose	 156 H"~"Meal type:                    	Breakfast                                       	"~"Meal type:                    	Dinner                                          	"~"Amount consumed:              	100%                                            	"~"Patient Education"

## 2024-08-23 NOTE — W.PN.CT
"Today's Communication / Plan"~"-"~"-: "~"-no issues overnight, no CP"~"-Plavix washout "~"-plans for AVR/CABG/maze/clip on Monday 8/26 by Dr. Gunter"~"-iv Heparin renewed"~"Assessment / Plan"~"-"~"-: "~"-Unstable angina at Cardiac rehab @ [facility] on Friday 8/16/24- no relief with sl Nitro, improved with Morphine"~"	-Left heart catheterization on Monday 8/18/24 revealed critical and extensively calcified mid left anterior descending as culprit vessel."~"	-Echocardiogram revealed a moderate AS and moderately elevated pulmonary artery pressure, EF 65-70%"~"	-on Plavix for recent stent, last dose on Monday 8/19/24"~"	-transferred to  on 8/20 for evaluation for CABG and possibly AVR"~"-Hx MI/CAD S/P PCI with VANESA to LAD in June 2024"~"-[place]"~"-T2DM since age of 19, on insulin with hgb A1C of  7.2         "~"-Diabetic neuropathy       "~"-PAF S/P ablation, 2021"~"-PSVT S/P ablation, 2021"~"-HTN"~"-HLD"~"-Class 2 obesity (BMI 37.6)"~"-S/P b/l cataracts"~"-S/p Repair of retinal detachment, 2021"~"-S/P b/l carpal tunnel "~"-S/p Cholecystectomy "~"-S/P Appendectomy"~"-S/P Left TKA "~"-S/p Release of trigger finger "~"-Gastric bypass noted on CT"~"Discussed patient care with: Nursing and Care Team"~"Subjective"~"-"~"-: "~"Date of Service:  August 23, 2024"~"Objective Data"~"-"~"-: "~"Lab Results"~"08/23/24 05:32 "~"08/21/24 05:47 "~"PT	 13.6 Sec (11.4-14.6)  	08/21/24  05:47    "~"INR	 1.06  	08/21/24  05:47    "~"APTT	 133.8 Sec (23.4-35.0)  H 	08/23/24  05:32    "~"Vital Signs"~"-: "~"Vital Signs"~"Temp	Pulse	Resp	BP	Pulse Ox"~" 98.3 F 	 73 	 18 	 119/57 	 95 "~" 08/22/24 20:56	 08/22/24 21:00	 08/22/24 20:56	 08/22/24 19:41	 08/22/24 20:56"~"CT Intake/Output/Weight"~"	08/22/24	08/22/24	08/23/24"~"	06:59	18:59	06:59"~"Intake Total		252 / 252	"~"Balance		252 / 252	"~"SaO2: 95"~"Physical Exam"~"-"~"-: "~"General: Awake and AOx3"~"Cardiovascular: Regular rate & rhythm, Murmur (2/6 systolic @ lsb) and No Rub"~"Respiratory: Decreased Breath Sounds"~"Extremities: No Edema"

## 2024-08-23 NOTE — CM
"Reviewed chart.  Met with  and Mrs. Givens to review discharge plans.  She states she is feeling well and waiting for her surgery.  Prior to admission she resides with her spouse in a spilt level home without any steps to enter. She has four "~"steps to get to her first level and ten steps to get to bedroom/full bathroom. Prior to admission she was independent with ambulation and adls.  She does not have any DME in the home. Her spouse will be home to assist in her care if needed.  Medical "~"work-up in progress The discharge plan is to return home with her spouse and a home visit by the Cardiothoracic Transitional Care Nurse when medically stable."

## 2024-08-23 NOTE — W.PN.UPDATE
"Update Note"~"Progress Note Update"~"-: "~"Urine culture: +E. Coli&gt;Amoxicillin/clavulanate 500/125mg po BID x 3 days"

## 2024-08-23 NOTE — PTCARENOTE
Received patient for the night in the chair attending to her insulin pump. SR on the monitor, HR in the 60s. Heparin infusing at 1200 units/hr. Therapeutic PTT x1, will recheck at 0300. Patient has no complaints at this time. Call bell within reach.

## 2024-08-24 VITALS — SYSTOLIC BLOOD PRESSURE: 114 MMHG | DIASTOLIC BLOOD PRESSURE: 59 MMHG

## 2024-08-24 VITALS — DIASTOLIC BLOOD PRESSURE: 54 MMHG | SYSTOLIC BLOOD PRESSURE: 143 MMHG

## 2024-08-24 VITALS — DIASTOLIC BLOOD PRESSURE: 54 MMHG | SYSTOLIC BLOOD PRESSURE: 138 MMHG

## 2024-08-24 VITALS — DIASTOLIC BLOOD PRESSURE: 98 MMHG | SYSTOLIC BLOOD PRESSURE: 119 MMHG

## 2024-08-24 VITALS — RESPIRATION RATE: 20 BRPM

## 2024-08-24 VITALS — DIASTOLIC BLOOD PRESSURE: 64 MMHG | SYSTOLIC BLOOD PRESSURE: 114 MMHG

## 2024-08-24 VITALS — DIASTOLIC BLOOD PRESSURE: 63 MMHG | SYSTOLIC BLOOD PRESSURE: 116 MMHG

## 2024-08-24 VITALS — DIASTOLIC BLOOD PRESSURE: 65 MMHG | SYSTOLIC BLOOD PRESSURE: 147 MMHG | RESPIRATION RATE: 18 BRPM

## 2024-08-24 VITALS — RESPIRATION RATE: 18 BRPM

## 2024-08-24 LAB
APTT PPP: 91.2 SEC (ref 23.4–35)
ERYTHROCYTE [DISTWIDTH] IN BLOOD BY AUTOMATED COUNT: 14.1 % (ref 11.5–14.5)
GLUCOSE - POINT OF CARE: 158 MG/DL (ref 70–99)
GLUCOSE - POINT OF CARE: 163 MG/DL (ref 70–99)
GLUCOSE - POINT OF CARE: 88 MG/DL (ref 70–99)
GLUCOSE - POINT OF CARE: 93 MG/DL (ref 70–99)
HCT VFR BLD AUTO: 28.3 % (ref 37–47)
HGB BLD-MCNC: 9.7 G/DL (ref 12–16)
MCHC RBC AUTO-ENTMCNC: 34.3 G/DL (ref 33–37)
MCV RBC AUTO: 85.5 FL (ref 81–99)
PLATELET # BLD AUTO: 160 10^3/UL (ref 130–400)

## 2024-08-24 RX ADMIN — Medication 50 MCG: at 08:13

## 2024-08-24 RX ADMIN — HEPARIN SODIUM 250: 10000 INJECTION, SOLUTION INTRAVENOUS at 09:59

## 2024-08-24 RX ADMIN — ACETAMINOPHEN 650 MG: 325 TABLET ORAL at 19:46

## 2024-08-24 RX ADMIN — ATORVASTATIN CALCIUM 40 MG: 40 TABLET, FILM COATED ORAL at 17:55

## 2024-08-24 RX ADMIN — ASPIRIN 81 MG: 81 TABLET, COATED ORAL at 08:13

## 2024-08-24 RX ADMIN — ISOSORBIDE MONONITRATE 60 MG: 60 TABLET, EXTENDED RELEASE ORAL at 08:13

## 2024-08-24 RX ADMIN — EZETIMIBE 10 MG: 10 TABLET ORAL at 17:55

## 2024-08-24 RX ADMIN — SOTALOL HYDROCHLORIDE 40 MG: 80 TABLET ORAL at 19:40

## 2024-08-24 RX ADMIN — GABAPENTIN 900 MG: 300 CAPSULE ORAL at 22:09

## 2024-08-24 RX ADMIN — AMOXICILLIN AND CLAVULANATE POTASSIUM 1 TABLET: 500; 125 TABLET, FILM COATED ORAL at 08:13

## 2024-08-24 RX ADMIN — AMOXICILLIN AND CLAVULANATE POTASSIUM 1 TABLET: 500; 125 TABLET, FILM COATED ORAL at 19:40

## 2024-08-24 RX ADMIN — SOTALOL HYDROCHLORIDE 40 MG: 80 TABLET ORAL at 08:12

## 2024-08-24 RX ADMIN — PANTOPRAZOLE SODIUM: 40 TABLET, DELAYED RELEASE ORAL at 08:13

## 2024-08-24 NOTE — PTCARENOTE
Rec'd pt at 0700. Pt AAOx3, sitting OOB in chair, ambulating in halls. Monitor SBR/SR. Lungs CTA. Heparin gtts infusing at 1200 units/hr.  at bedside.

## 2024-08-24 NOTE — PTCARENOTE
rec'd pt oob in recliner chair with c/o H/A. medicated with Tylenol with relief noted. Sinus on telemetry. heparin continued at 1400 unts/hr.

## 2024-08-25 VITALS — DIASTOLIC BLOOD PRESSURE: 52 MMHG | SYSTOLIC BLOOD PRESSURE: 138 MMHG

## 2024-08-25 VITALS — RESPIRATION RATE: 16 BRPM | SYSTOLIC BLOOD PRESSURE: 103 MMHG | DIASTOLIC BLOOD PRESSURE: 51 MMHG

## 2024-08-25 VITALS — DIASTOLIC BLOOD PRESSURE: 74 MMHG | SYSTOLIC BLOOD PRESSURE: 133 MMHG

## 2024-08-25 VITALS — RESPIRATION RATE: 18 BRPM

## 2024-08-25 VITALS — DIASTOLIC BLOOD PRESSURE: 52 MMHG | SYSTOLIC BLOOD PRESSURE: 121 MMHG

## 2024-08-25 VITALS — DIASTOLIC BLOOD PRESSURE: 51 MMHG | SYSTOLIC BLOOD PRESSURE: 110 MMHG

## 2024-08-25 VITALS — DIASTOLIC BLOOD PRESSURE: 43 MMHG | SYSTOLIC BLOOD PRESSURE: 125 MMHG

## 2024-08-25 VITALS — SYSTOLIC BLOOD PRESSURE: 137 MMHG | DIASTOLIC BLOOD PRESSURE: 68 MMHG

## 2024-08-25 VITALS — RESPIRATION RATE: 20 BRPM

## 2024-08-25 LAB
APTT PPP: 97.8 SEC (ref 23.4–35)
ERYTHROCYTE [DISTWIDTH] IN BLOOD BY AUTOMATED COUNT: 14 % (ref 11.5–14.5)
GLUCOSE - POINT OF CARE: 103 MG/DL (ref 70–99)
GLUCOSE - POINT OF CARE: 109 MG/DL (ref 70–99)
GLUCOSE - POINT OF CARE: 161 MG/DL (ref 70–99)
GLUCOSE - POINT OF CARE: 84 MG/DL (ref 70–99)
HCT VFR BLD AUTO: 30.7 % (ref 37–47)
HGB BLD-MCNC: 10.5 G/DL (ref 12–16)
MCHC RBC AUTO-ENTMCNC: 34.2 G/DL (ref 33–37)
MCV RBC AUTO: 85.5 FL (ref 81–99)
PLATELET # BLD AUTO: 159 10^3/UL (ref 130–400)

## 2024-08-25 RX ADMIN — GABAPENTIN 900 MG: 300 CAPSULE ORAL at 22:15

## 2024-08-25 RX ADMIN — HEPARIN SODIUM 250: 10000 INJECTION, SOLUTION INTRAVENOUS at 06:52

## 2024-08-25 RX ADMIN — PANTOPRAZOLE SODIUM 40 MG: 40 TABLET, DELAYED RELEASE ORAL at 07:46

## 2024-08-25 RX ADMIN — SOTALOL HYDROCHLORIDE 40 MG: 80 TABLET ORAL at 19:34

## 2024-08-25 RX ADMIN — SOTALOL HYDROCHLORIDE 40 MG: 80 TABLET ORAL at 07:44

## 2024-08-25 RX ADMIN — Medication 50 MCG: at 07:46

## 2024-08-25 RX ADMIN — AMOXICILLIN AND CLAVULANATE POTASSIUM 1 TABLET: 500; 125 TABLET, FILM COATED ORAL at 19:34

## 2024-08-25 RX ADMIN — ISOSORBIDE MONONITRATE 60 MG: 60 TABLET, EXTENDED RELEASE ORAL at 07:46

## 2024-08-25 RX ADMIN — ASPIRIN 81 MG: 81 TABLET, COATED ORAL at 07:46

## 2024-08-25 RX ADMIN — EZETIMIBE 10 MG: 10 TABLET ORAL at 17:37

## 2024-08-25 RX ADMIN — AMOXICILLIN AND CLAVULANATE POTASSIUM 1 TABLET: 500; 125 TABLET, FILM COATED ORAL at 07:46

## 2024-08-25 RX ADMIN — ATORVASTATIN CALCIUM 40 MG: 40 TABLET, FILM COATED ORAL at 17:36

## 2024-08-25 NOTE — W.PN.CT
"Addendum entered and electronically signed by Kaushik Acevedo MD  08/25/24 09:27: "~"I saw and examined the patient."~"The PA's note was reviewed and I agree with the note."~"Comment:   "~"CABG tomorrow"~"Original Note:"~"Today's Communication / Plan"~"-"~"-: "~"Plan AVR/CABG/MAZE/THUAN Clip tomorrow, second case, Dr. Gunter."~"Continue heparin drip.  Will stop tomorrow morning in anticipation of surgery."~"I discussed the perioperative expectations with the patient and answered her questions to her satisfaction.  Dr. Gunter will also follow-up today.  Informed consent will be obtained."~"N.p.o. after midnight, type and cross."~"Anxious, consider anxiolytics as needed.  Hold for now."~"Assessment / Plan"~"-"~"-: "~"-Unstable angina at Cardiac rehab @ [facility] on Friday 8/16/24- no relief with sl Nitro, improved with Morphine"~"	-Left heart catheterization on Monday 8/18/24 revealed critical and extensively calcified mid left anterior descending as culprit vessel."~"	-Echocardiogram revealed a moderate AS and moderately elevated pulmonary artery pressure, EF 65-70%"~"	-on Plavix for recent stent, last dose on Monday 8/19/24"~"	-transferred to  on 8/20 for evaluation for CABG and possibly AVR"~"-Hx MI/CAD S/P PCI with VANESA to LAD in June 2024"~"-[place]"~"-T2DM since age of 19, on insulin with hgb A1C of  7.2         "~"-Diabetic neuropathy       "~"-PAF S/P ablation, 2021"~"-PSVT S/P ablation, 2021"~"-HTN"~"-HLD"~"-Class 2 obesity (BMI 37.6)"~"-S/P b/l cataracts"~"-S/p Repair of retinal detachment, 2021"~"-S/P b/l carpal tunnel "~"-S/p Cholecystectomy "~"-S/P Appendectomy"~"-S/P Left TKA "~"-S/p Release of trigger finger "~"-Gastric bypass noted on CT"~"Subjective"~"-"~"-: "~"Date of Service:  August 25, 2024"~"Feels okay.  No chest pain overnight.  Remains on heparin.  Anxious."~"Objective Data"~"-"~"-: "~"Lab Results"~"08/25/24 03:54 "~"08/21/24 05:47 "~"PT	 13.6 Sec (11.4-14.6)  	08/21/24  05:47    "~"INR	 1.06  	08/21/24  05:47    "~"APTT	 97.8 Sec (23.4-35.0)  H 	08/25/24  03:54    "~"Vital Signs"~"-: "~"Vital Signs"~"Temp	Pulse	Resp	BP	Pulse Ox"~" 98.2 F 	 77 	 20 	 125/43 	 98 "~" 08/25/24 04:05	 08/25/24 04:00	 08/25/24 04:05	 08/25/24 03:44	 08/25/24 04:05"~"CT Intake/Output/Weight"~"	08/24/24	08/24/24	08/25/24"~"	06:59	18:59	06:59"~"Intake Total		684 / 828	144 / 828"~"Balance		684 / 828	144 / 828"~"SaO2: 98"~"Physical Exam"~"-"~"General: Awake and Oriented"~"Cardiovascular: Regular rate & rhythm"~"Respiratory: Clear and Equal"~"Data Reviewed"~"-"~"Lab Results: Results Reviewed"~"Medications: Active Meds Reviewed"

## 2024-08-25 NOTE — PTCARENOTE
"pt has been ambulating through the halls and tolerating well. pt is sr on the monitor, hr in the 60s, vss. pt offers no complaints at this time. pt educated on plan of care for the evening, including prep for OR tomorrow. heparin gtt running per "~"protocol, see documentation. call bell within reach. "

## 2024-08-26 VITALS — SYSTOLIC BLOOD PRESSURE: 74 MMHG | DIASTOLIC BLOOD PRESSURE: 54 MMHG

## 2024-08-26 VITALS — DIASTOLIC BLOOD PRESSURE: 62 MMHG | SYSTOLIC BLOOD PRESSURE: 114 MMHG

## 2024-08-26 VITALS — SYSTOLIC BLOOD PRESSURE: 117 MMHG | DIASTOLIC BLOOD PRESSURE: 52 MMHG

## 2024-08-26 VITALS — RESPIRATION RATE: 16 BRPM | SYSTOLIC BLOOD PRESSURE: 70 MMHG | DIASTOLIC BLOOD PRESSURE: 54 MMHG

## 2024-08-26 VITALS — DIASTOLIC BLOOD PRESSURE: 40 MMHG | SYSTOLIC BLOOD PRESSURE: 71 MMHG | RESPIRATION RATE: 16 BRPM

## 2024-08-26 VITALS — SYSTOLIC BLOOD PRESSURE: 75 MMHG | DIASTOLIC BLOOD PRESSURE: 41 MMHG

## 2024-08-26 VITALS — SYSTOLIC BLOOD PRESSURE: 90 MMHG | RESPIRATION RATE: 16 BRPM | DIASTOLIC BLOOD PRESSURE: 42 MMHG

## 2024-08-26 VITALS — DIASTOLIC BLOOD PRESSURE: 43 MMHG | SYSTOLIC BLOOD PRESSURE: 74 MMHG | RESPIRATION RATE: 16 BRPM

## 2024-08-26 VITALS — RESPIRATION RATE: 16 BRPM | SYSTOLIC BLOOD PRESSURE: 85 MMHG | DIASTOLIC BLOOD PRESSURE: 61 MMHG

## 2024-08-26 VITALS — SYSTOLIC BLOOD PRESSURE: 116 MMHG | DIASTOLIC BLOOD PRESSURE: 43 MMHG

## 2024-08-26 VITALS — RESPIRATION RATE: 20 BRPM

## 2024-08-26 VITALS — RESPIRATION RATE: 18 BRPM

## 2024-08-26 LAB
ACT BLD: 107 SECONDS (ref 82–134)
ACT BLD: 521 SECONDS (ref 82–134)
ACT BLD: 526 SECONDS (ref 82–134)
ACT BLD: 592 SECONDS (ref 82–134)
ACT BLD: 753 SECONDS (ref 82–134)
ACT BLD: 773 SECONDS (ref 82–134)
ACT BLD: 90 SECONDS (ref 82–134)
APTT PPP: 100.6 SEC (ref 23.4–35)
APTT PPP: 37.2 SEC (ref 23.4–35)
B.E. - POC: -1.1 MMOL/L
B.E. - POC: -1.2 MMOL/L
B.E. - POC: -1.7 MMOL/L
B.E. - POC: -2.8 MMOL/L
B.E. - POC: 0.4 MMOL/L
B.E. - POC: 2.1 MMOL/L
BASE EXCESS BLDA CALC-SCNC: -1.7 MMOL/L
BASE EXCESS BLDA CALC-SCNC: -4.6 MMOL/L
BUN SERPL-MCNC: 13 MG/DL (ref 7–17)
ERYTHROCYTE [DISTWIDTH] IN BLOOD BY AUTOMATED COUNT: 14.2 % (ref 11.5–14.5)
ESTIMATED CREATININE CLEARANCE: 82 ML/MIN
GLUCOSE - POC: 109 MG/DL (ref 65–99)
GLUCOSE - POC: 125 MG/DL (ref 65–99)
GLUCOSE - POC: 140 MG/DL (ref 65–99)
GLUCOSE - POC: 152 MG/DL (ref 65–99)
GLUCOSE - POC: 178 MG/DL (ref 65–99)
GLUCOSE - POC: 90 MG/DL (ref 65–99)
GLUCOSE - POINT OF CARE: 120 MG/DL (ref 70–99)
GLUCOSE - POINT OF CARE: 121 MG/DL (ref 70–99)
GLUCOSE - POINT OF CARE: 133 MG/DL (ref 70–99)
GLUCOSE - POINT OF CARE: 134 MG/DL (ref 70–99)
GLUCOSE - POINT OF CARE: 153 MG/DL (ref 70–99)
GLUCOSE - POINT OF CARE: 86 MG/DL (ref 70–99)
GLUCOSE SERPL-MCNC: 81 MG/DL (ref 70–99)
HCO3 - POC: 23 MMOL/L (ref 21–29)
HCO3 - POC: 24 MMOL/L (ref 21–29)
HCO3 - POC: 25 MMOL/L (ref 21–29)
HCO3 - POC: 26 MMOL/L (ref 21–29)
HCO3 BLDA-SCNC: 20.2 MMOL/L (ref 21–28)
HCO3 BLDA-SCNC: 23 MMOL/L (ref 21–28)
HCT VFR BLD AUTO: 26.6 % (ref 37–47)
HCT VFR BLD AUTO: 27.8 % (ref 37–47)
HEMATOCRIT - POC: 23 % PCV (ref 37–47)
HEMATOCRIT - POC: 24 % PCV (ref 37–47)
HEMATOCRIT - POC: 26 % PCV (ref 37–47)
HEMATOCRIT - POC: 26 % PCV (ref 37–47)
HEMOGLOBIN CALCULATED - POC: 7.7
HEMOGLOBIN CALCULATED - POC: 8.2
HEMOGLOBIN CALCULATED - POC: 8.2
HEMOGLOBIN CALCULATED - POC: 8.3
HEMOGLOBIN CALCULATED - POC: 8.7
HEMOGLOBIN CALCULATED - POC: 8.8
HGB BLD-MCNC: 9.4 G/DL (ref 12–16)
HGB BLD-MCNC: 9.5 G/DL (ref 12–16)
INR PPP: 1.56
IONIZED CALCIUM - POC: 1.04 MMOL/L (ref 1.12–1.27)
IONIZED CALCIUM - POC: 1.09 MMOL/L (ref 1.12–1.27)
IONIZED CALCIUM - POC: 1.1 MMOL/L (ref 1.12–1.27)
IONIZED CALCIUM - POC: 1.12 MMOL/L (ref 1.12–1.27)
IONIZED CALCIUM - POC: 1.23 MMOL/L (ref 1.12–1.27)
IONIZED CALCIUM - POC: 1.26 MMOL/L (ref 1.12–1.27)
MAGNESIUM SERPL-MCNC: 2.3 MG/DL (ref 1.6–2.3)
MCHC RBC AUTO-ENTMCNC: 34.2 G/DL (ref 33–37)
MCV RBC AUTO: 85.5 FL (ref 81–99)
O2 SATURATION %CALCULATED-POC: 100 5 (ref 92–96)
O2 SATURATION %CALCULATED-POC: 99.8 5 (ref 92–96)
O2 SATURATION %CALCULATED-POC: 99.9 5 (ref 92–96)
PCO2 - POC: 37 MMHG (ref 35–45)
PCO2 - POC: 40 MMHG (ref 35–45)
PCO2 - POC: 41 MMHG (ref 35–45)
PCO2 - POC: 42 MMHG (ref 35–45)
PCO2 - POC: 42 MMHG (ref 35–45)
PCO2 - POC: 46 MMHG (ref 35–45)
PCO2 BLDA: 35 MMHG (ref 32–35)
PCO2 BLDA: 38 MMHG (ref 32–35)
PH - POC: 7.32 (ref 7.35–7.45)
PH - POC: 7.37 (ref 7.35–7.45)
PH - POC: 7.37 (ref 7.35–7.45)
PH - POC: 7.38 (ref 7.35–7.45)
PH - POC: 7.39 (ref 7.35–7.45)
PH - POC: 7.46 (ref 7.35–7.45)
PLATELET # BLD AUTO: 117 10^3/UL (ref 130–400)
PLATELET # BLD AUTO: 150 10^3/UL (ref 130–400)
PO2 - POC: 239 MMHG (ref 80–100)
PO2 - POC: 266 MMHG (ref 80–100)
PO2 - POC: 287 MMHG (ref 80–100)
PO2 - POC: 300 MMHG (ref 80–100)
PO2 - POC: 338 MMHG (ref 80–100)
PO2 - POC: 421 MMHG (ref 80–100)
PO2 BLDA: 113 MMHG (ref 83–108)
PO2 BLDA: 151 MMHG (ref 83–108)
POTASSIUM - POC: 3.5 MMOL/L (ref 3.6–5)
POTASSIUM - POC: 3.9 MMOL/L (ref 3.6–5)
POTASSIUM - POC: 4 MMOL/L (ref 3.6–5)
POTASSIUM - POC: 4.1 MMOL/L (ref 3.6–5)
POTASSIUM - POC: 4.8 MMOL/L (ref 3.6–5)
POTASSIUM - POC: 5.1 MMOL/L (ref 3.6–5)
POTASSIUM BLDA-SCNC: 3.9 MMOL/L (ref 3.5–5.1)
POTASSIUM BLDA-SCNC: 4 MMOL/L (ref 3.5–5.1)
PROTHROMBIN TIME: 18.5 SEC (ref 11.4–14.6)
SAO2 % BLDA: 98.7 % (ref 94–98)
SAO2 % BLDA: 99.5 % (ref 94–98)
SODIUM - POC: 141 MMOL/L (ref 135–145)
SODIUM - POC: 141 MMOL/L (ref 135–145)
SODIUM - POC: 142 MMOL/L (ref 135–145)
SODIUM - POC: 143 MMOL/L (ref 135–145)
SODIUM - POC: 144 MMOL/L (ref 135–145)
SODIUM - POC: 146 MMOL/L (ref 135–145)
SODIUM BLDA-SCNC: 140 MMOL/L (ref 136–145)
SPECIMEN PROCESSING COMMENT: (no result)
SQUAMOUS URNS QL MICRO: (no result) /LPF
URINE RED BLOOD CELL: (no result) /HPF (ref 0–2)
URINE WHITE CELL: (no result) /HPF (ref 0–5)

## 2024-08-26 PROCEDURE — 02RF08Z REPLACEMENT OF AORTIC VALVE WITH ZOOPLASTIC TISSUE, OPEN APPROACH: ICD-10-PCS | Performed by: STUDENT IN AN ORGANIZED HEALTH CARE EDUCATION/TRAINING PROGRAM

## 2024-08-26 PROCEDURE — 06BP4ZZ EXCISION OF RIGHT SAPHENOUS VEIN, PERCUTANEOUS ENDOSCOPIC APPROACH: ICD-10-PCS | Performed by: STUDENT IN AN ORGANIZED HEALTH CARE EDUCATION/TRAINING PROGRAM

## 2024-08-26 PROCEDURE — 02L70CK OCCLUSION OF LEFT ATRIAL APPENDAGE WITH EXTRALUMINAL DEVICE, OPEN APPROACH: ICD-10-PCS | Performed by: STUDENT IN AN ORGANIZED HEALTH CARE EDUCATION/TRAINING PROGRAM

## 2024-08-26 PROCEDURE — 021109W BYPASS CORONARY ARTERY, TWO ARTERIES FROM AORTA WITH AUTOLOGOUS VENOUS TISSUE, OPEN APPROACH: ICD-10-PCS | Performed by: STUDENT IN AN ORGANIZED HEALTH CARE EDUCATION/TRAINING PROGRAM

## 2024-08-26 PROCEDURE — 30233R1 TRANSFUSION OF NONAUTOLOGOUS PLATELETS INTO PERIPHERAL VEIN, PERCUTANEOUS APPROACH: ICD-10-PCS | Performed by: STUDENT IN AN ORGANIZED HEALTH CARE EDUCATION/TRAINING PROGRAM

## 2024-08-26 PROCEDURE — 06BQ4ZZ EXCISION OF LEFT SAPHENOUS VEIN, PERCUTANEOUS ENDOSCOPIC APPROACH: ICD-10-PCS | Performed by: STUDENT IN AN ORGANIZED HEALTH CARE EDUCATION/TRAINING PROGRAM

## 2024-08-26 PROCEDURE — B24BZZ4 ULTRASONOGRAPHY OF HEART WITH AORTA, TRANSESOPHAGEAL: ICD-10-PCS | Performed by: ANESTHESIOLOGY

## 2024-08-26 PROCEDURE — 5A1221Z PERFORMANCE OF CARDIAC OUTPUT, CONTINUOUS: ICD-10-PCS | Performed by: STUDENT IN AN ORGANIZED HEALTH CARE EDUCATION/TRAINING PROGRAM

## 2024-08-26 PROCEDURE — 02580ZZ DESTRUCTION OF CONDUCTION MECHANISM, OPEN APPROACH: ICD-10-PCS | Performed by: STUDENT IN AN ORGANIZED HEALTH CARE EDUCATION/TRAINING PROGRAM

## 2024-08-26 PROCEDURE — 02100Z9 BYPASS CORONARY ARTERY, ONE ARTERY FROM LEFT INTERNAL MAMMARY, OPEN APPROACH: ICD-10-PCS | Performed by: STUDENT IN AN ORGANIZED HEALTH CARE EDUCATION/TRAINING PROGRAM

## 2024-08-26 PROCEDURE — 30233N1 TRANSFUSION OF NONAUTOLOGOUS RED BLOOD CELLS INTO PERIPHERAL VEIN, PERCUTANEOUS APPROACH: ICD-10-PCS | Performed by: STUDENT IN AN ORGANIZED HEALTH CARE EDUCATION/TRAINING PROGRAM

## 2024-08-26 RX ADMIN — SODIUM CHLORIDE 500: 900 INJECTION, SOLUTION INTRAVENOUS at 20:14

## 2024-08-26 RX ADMIN — ISOSORBIDE MONONITRATE 60 MG: 60 TABLET, EXTENDED RELEASE ORAL at 09:03

## 2024-08-26 RX ADMIN — POTASSIUM CHLORIDE 50: 400 INJECTION, SOLUTION INTRAVENOUS at 22:06

## 2024-08-26 RX ADMIN — HEPARIN SODIUM 250: 10000 INJECTION, SOLUTION INTRAVENOUS at 04:21

## 2024-08-26 RX ADMIN — MUPIROCIN 1 APPLIC: 20 OINTMENT TOPICAL at 21:50

## 2024-08-26 RX ADMIN — ATORVASTATIN CALCIUM: 40 TABLET, FILM COATED ORAL at 20:13

## 2024-08-26 RX ADMIN — AMIODARONE HYDROCHLORIDE: 200 TABLET ORAL at 23:25

## 2024-08-26 RX ADMIN — ACETAMINOPHEN: 500 TABLET ORAL at 23:22

## 2024-08-26 RX ADMIN — AMOXICILLIN AND CLAVULANATE POTASSIUM: 500; 125 TABLET, FILM COATED ORAL at 09:04

## 2024-08-26 RX ADMIN — INSULIN ASPART: 100 INJECTION, SOLUTION INTRAVENOUS; SUBCUTANEOUS at 20:12

## 2024-08-26 RX ADMIN — CALCIUM CHLORIDE 50 MG: 100 INJECTION INTRAVENOUS; INTRAVENTRICULAR at 20:11

## 2024-08-26 RX ADMIN — SODIUM BICARBONATE 50 MEQ: 84 INJECTION INTRAVENOUS at 21:48

## 2024-08-26 RX ADMIN — ONDANSETRON HYDROCHLORIDE 4 MG: 2 SOLUTION INTRAMUSCULAR; INTRAVENOUS at 22:52

## 2024-08-26 RX ADMIN — GABAPENTIN: 300 CAPSULE ORAL at 23:25

## 2024-08-26 RX ADMIN — SOTALOL HYDROCHLORIDE 40 MG: 80 TABLET ORAL at 09:01

## 2024-08-26 RX ADMIN — ASPIRIN 81 MG: 81 TABLET, COATED ORAL at 09:02

## 2024-08-26 RX ADMIN — EZETIMIBE: 10 TABLET ORAL at 20:13

## 2024-08-26 RX ADMIN — AMIODARONE HYDROCHLORIDE: 200 TABLET ORAL at 20:14

## 2024-08-26 RX ADMIN — CALCIUM CHLORIDE 50 ML: 100 INJECTION INTRAVENOUS; INTRAVENTRICULAR at 20:11

## 2024-08-26 RX ADMIN — MUPIROCIN 1 APPLIC: 20 OINTMENT TOPICAL at 06:34

## 2024-08-26 RX ADMIN — Medication: at 09:04

## 2024-08-26 RX ADMIN — CEFAZOLIN 10: 10 INJECTION, POWDER, FOR SOLUTION INTRAVENOUS at 20:12

## 2024-08-26 RX ADMIN — POTASSIUM CHLORIDE 50: 400 INJECTION, SOLUTION INTRAVENOUS at 23:09

## 2024-08-26 RX ADMIN — ACETAMINOPHEN: 500 TABLET ORAL at 20:14

## 2024-08-26 RX ADMIN — SENNOSIDES AND DOCUSATE SODIUM: 8.6; 5 TABLET ORAL at 20:14

## 2024-08-26 RX ADMIN — PANTOPRAZOLE SODIUM 40 MG: 40 TABLET, DELAYED RELEASE ORAL at 09:03

## 2024-08-26 NOTE — PTCARENOTE
CVPA Ed T aware of past CI &lt;2.0 - states will order MVO2 to be drawn at midnight at the same time he would like H&H/PLT/ABG's drawn

## 2024-08-26 NOTE — PTCARENOTE
Pre op education given to patient.  Heparin drip stopped, insulin pump removed, pt's teeth and jewelry removed.  Pt sent w/ antibiotic.  VSS.  Pt transported to [facility] in CV bed accompanied by pt's spouse and a female.  Report given to [facility] RN.

## 2024-08-26 NOTE — W.PN.CT.SURG
"CT Surgery Operative Note"~"-"~"-: "~"CARDIAC SURGERY OPERATIVE REPORT"~"Preoperative Diagnosis: Aortic valve stenosis with multivessel coronary artery disease status post multiple stents and in-stent restenosis, paroxysmal atrial fibrillation"~"Postoperative Diagnosis: Same"~"Procedure(s) Performed:"~"1.  Standard sternotomy with aortic and right atrial cannulation"~"2.  Coronary artery bypass grafting x 3 (In situ LIMA to distal LAD, Ao to RSVG to proximal diagonal, Ao to RSVG to distal RCA)"~"3.  Endoscopic harvest of bilateral lower extremities ultimately using the left lower extremity vein"~"4.  Surgical aortic valve replacement (21 mm bioprosthesis)"~"5.  Trans-esophageal echocardiography"~"6.  Placement of temporary ventricular pacing wire"~"7.  Modified posterior wall isolation, left atrial maze using RF ablation"~"8.  Left atrial appendage exclusion (35 mm clip)"~"9.  Debridement of significant calcifications around the ostium of the left and right coronary"~"Date of Surgery: 8/26/2024"~"Comorbidities: 	"~"1.  Moderate aortic valve stenosis with severe mitral annular calcification and calcified root"~"2.  Multivessel coronary artery disease status post multiple PCI and stenting with in-stent restenosis for previous myocardial infarction"~"3.  Paroxysmal atrial fibrillation status post ablation"~"4.  Morbidly obese with a BMI of 37"~"5.  Diabetes mellitus, insulin-dependent"~"6.  Hypertension"~"7.  Hyperlipidemia"~"8.  Status post Gissell-en-Y gastric bypass surgery"~"Attending Surgeon: Guanaco Gunter MD, MS"~"Assistants: Milly Anderson PA-C (present and necessary to first assist, retraction, suction, exposure, suture management, and wound closure under my direction) & Guanaco Mcclure PA-C (Endo vein harvest)"~"Anesthesiology: Negrito Blount MD and Martin Klesch, CRNA"~"Scrub and Circulating RNs: Kayden Robb RN, Negra Luna RN"~"Perfusionist: Emmanuel Ortega CCP"~"Anesthesia: GETA"~"EBL: per perfusion records"~"Products: 2 prbc"~"CPB Time: 148 minutes"~"Aortic Cross Clamp Time: 128 minutes"~"Indication(s) for Procedures: This is a 64-year-old female who recently underwent stenting several months ago but and developed in-stent restenosis and chest pain in the form of angina.  She has had multiple PCI procedures in the past.  "~"Multidisciplinary team discussion between multiple cardiologist and myself ultimately came to the consensus that multivessel revascularization as well as management of her atrial fibrillation and her aortic valve stenosis would be the best course of "~"action for the patient.  We discussed the risk and benefits of her complex procedure and she opted to move forward with surgical intervention."~"Aortic Valve Description: Calcified mostly towards the left coronary cusp with extensive infiltration into the left coronary cusp annulus extending down onto the mitral valve as part of a mitral annular calcification.  There was significant boulders "~"of calcium overlying the superior portion of the left main os and right coronary which was debrided os."~"Conduit(s) Quality: 	"~"LIMA -good/excellent flow"~"RSVG -good/uniform with minimal varicosities and no significant thickening"~"Target(s) Quality:	"~"RCA/PDA -excellent/overall heavily calcified vessel however there was excellent flow with test dosing of antegrade at approximately 60 cc a minute at a pressure of 80 mmHg"~"Diag -good/smaller size vessel however given the extent of the in-stent restenosis in the proximal LAD stenosis this provided good backfilling.  Test dosing antegrade had a flow of approximately 40 to 50 cc a minute at a pressure of 80 mmHg"~"LAD -excellent/the LIMA was grafted post the previous stent at the midportion with excellent visual flow in the LAD territory upon removal of the bulldog clamp"~"Findings: LVEF on intraoperative GEORGETTE was 60% and 60% post procedure with no new regional wall motion abnormalities. There was no prosthetic PVL or AI.  Due to her previous Gissell-en-Y gastric bypass, a mean gradient was not able to be obtained with "~"GEORGETTE from the transgastric view.  However there was no obvious flow acceleration across the LVOT or turbulence across the valve.  Upon inspection of her targets, the previous stent to the OM yielded a very small and atretic vessel.  This was not "~"bypassed.  Her LPL branch was also extremely small as it exited the atrial-ventricular groove and so this was not bypassed. The LIMA was harvested in a skeletonized fashion. Following bypass grafting, test dose cardioplegia was given down each "~"distal and confirmed patency and hemostasis, both vein graft had excellent flow with test dosing of antegrade.  Her aortic valve was replaced with a total of 13 nonpledgeted 2 Ethibond sutures with 3 of those being pledgeted towards the left "~"coronary cusp annulus as this was healthy calcified and required heavy debridement down onto the mitral valve.  The sutures were placed in inverted fashion from LVOT through annulus through sewing cuff of the valve.  Both left and right coronary "~"ostia were visualized and unobstructed by the valve struts.  There was dense calcification and boulders of calcium above the ostium of both the left main and the right coronary ostium.  This required debridement in order to safely pass the valve "~"down across the STJ into the root.  The valve sutures were secured using cor knot.  Due to the extensive calcification of her root extending into her mitral annular calcification, I felt it was not safe to perform an aortic root enlargement as this "~"would risk a difficult hemorrhage from the suture line.  Left atrial appendage was verified to be free of any thrombus or debris preoperatively and sized with 35 mm clip.  This was applied flush to the base with no residual color flow on Doppler.  3 "~"successful pairs of ablation were performed using the encompass clamp isolating the posterior wall.  She did not require any inotropic support following cardiopulmonary bypass, she was anemic preoperatively with a hemoglobin in the eights requiring "~"blood products while on heart-lung machine, otherwise she was in sinus rhythm with an appropriate cardiac index."~"Specimen(s): Aortic valve. "~"Prosthesis: "~"1.  21 mm Rodriguez Inspiris Resilia aortic valve prosthesis serial number: [id number]"~"2.  35 mm clip to the left atrial appendage, serial #[id number]"~"Description of Procedure: The patient was taken to the operating room. Their identity and procedure to be performed were verified and they were positioned supine on the operating table. Induction via general anesthesia with endotracheal intubation "~"was performed and central venous access and arterial monitoring were inserted. A preoperative transesophageal echocardiogram was performed. The patient was then prepped and draped from chin to feet in a sterile fashion. A preoperative time-out was "~"performed with all members of the team present. A midline chest incision was performed along with median sternotomy. Simultaneous endoscopic access of the right lower extremity for saphenous vein harvest was obtained along with administration of an "~"initial 5,000 units of IV heparin, ultimately no usable vein was able to be obtained from the right lower extremity and so the left thigh was also explored and found to have much better vein conduit.. A RulTract sternal retractor was positioned to "~"exposure the left internal mammary bed. The mammary was harvested in a skeletonized fashion and found to have good flow. A medium clip was applied to the distal end of the mammary after dividing it. It was wrapped in a papaverine soaked RayTec and "~"replaced back into the left hemithorax. The RulTract was exchanged for a median sternal retractor. The innominate vein was isolated. Full heparinization was given (a total of 45,000 units). I created a pericardial well. The aortic cannulation site "~"was chosen where it was soft, pliable, and free of calcium. Cannulation was performed with an arterial cannula in the ascending aorta and a triple-stage venous cannula through the right atrial appendage. The arterial cannula line had an appropriate "~"bounce and correlating pressures with test dosing. Next, a root vent/antegrade cannula was inserted into the ascending aorta. The ACT was confirmed to be over 400 and retrograde autologous priming was performed before commencing cardiopulmonary "~"bypass.  The SVC was dissected off of the right pulmonary artery and the oblique sinus was developed.  Using the encompass clamp posterior wall isolation of the left atrium along the pulmonary veins was performed.  The ligament of Nolberto was "~"divided.  The pulmonary artery was  away from the aorta to facilitate a clamp site. The aortic cross-clamp was placed after decreasing the flow on the bypass and mean arterial pressure. A total of 1.2L initial dose of antegrade Del-Nido "~"cardioplegia solution was given and planned for re-dosing every 75 minutes as necessary. There was rapid electro-mechanical arrest of the heart at 300 cc of cardioplegia. The left ventricle was observed for distention on echocardiogram and manual "~"palpation. Cold slush was placed into a sponge and topically on the RV while we systemically cooled to 34 degrees centigrade.  Once cardioplegia was complete and the heart was arrested, the heart was medialized and the ligament Nolberto was divided "~"and 35 mm clip was applied flush to the base of left atrial appendage."~"A suitable target on the distal left anterior descending was identified. We dissected and prepared the distal target in a similar fashion. We retrieved the LIMA from the chest and created a pericardial opening while being cognizant of the phrenic "~"nerve to facilitate the course of the mammary. The distal end of the mammary was prepped and beveled to size. We verified orientation and length of the RODRICK and found brisk flow. An end-to-side anastomosis was created with a 8-0 prolene and secured "~"with a micro corknot. We temporarily released the bulldog clamp on the mammary to inspect flow. Perfusion to the LAD territory was visualized and hemostasis was confirmed. The bull clamp was replaced on the mammary. A suitable site on the proximal "~"diagonal branch was chosen. We dissected and prepared the distal target in a similar fashion. An end-to-side anastomosis was created with a 7-0 prolene and secured with a micro corknot. Antegrade cardioplegia was administered into the graft. "~"Appropriate hemostasis and flow were confirmed. The graft was measured for length to the aorta and cut. I positioned the heart to expose the distal right coronary at the posterior descending artery. A [place] blade was used to expose the coronary and "~"perform the arteriotomy. Coronary Nicole scissors were used to enlarge the incision. The saphenous vein was trimmed and beveled to an appropriate size. The distal anastomosis was performed using 7-0 prolene in an end-to-side fashion. Antegrade "~"cardioplegia was administered into the graft. Appropriate hemostasis and flow were confirmed. The graft was measured for length to the aorta and cut.  All grafts were tested with antegrade cardioplegia and found to have excellent flow and hemostasis."~"Next, Carbon dioxide was used to flood the field. I turned my attention to the aortic valve and manually identified the location of the right coronary take off. An aortotomy was made approximately 1.5cm above the sinotubular junction. The location "~"of both left and right coronary vessels were visualized in the root. The aortic valve was inspected and found to be heavily calcified towards the left coronary cusp into the annulus and down towards the mitral valve. The leaflets were excised and "~"sent for pathological assessment. The annulus was debrided of any calcium. The root and left ventricular outflow tract were thoroughly irrigated to remove any debris.  Due to the level of calcification of the annulus into the mitral valve, I elected "~"not to perform an aortic root enlargement as this would risk significant hemorrhage.  A total of 13 pledgeted nonpledgeted 2-0 ethibond annular sutures were placed LVOT-to-aorta circumferentially.  Of these, 3 of the pledgeted sutures were placed "~"along the left coronary cusp annulus as this was heavily calcified and required extensive debridement.  Calcium over top of the left main ostium the right coronary ostium required debridement in order to parachute the valve and safely without "~"disrupting the calcium unintentionally.  Next, the valve sutures were brought through the sewing cuff of the prosthetic valve which as then parachuted into place. The left and right coronary ostia were visualized and were unobstructed by the valve. "~"A Cor-Knot device was used to secure the annular sutures. The valve was inspected and was well seated. The aortotomy was approximated with 4-0 prolene in two layers. The heart was filled and the root was distended with antegrade cardioplegia to make "~"final assessment of graft length and orientation. I created 2 aortotomies using a #11 blade then a 4.0mm aortic punch above the aortic suture line. The proximal anastomoses were created in an end-to-side fashion using 6-0 prolene. At the same time, "~"we started to re-warm to 36.5 degrees centigrade. The bulldog clamp was removed from the mammary and temporary bipolar ventricular pacing wires were placed on the base of the right ventricle. The patient was placed in a trendelenburg position and "~"flows on bypass were lowered. The aortic cross clamp was removed and flows were slowly brought back up. The aortotomy appeared hemostatic. All bypass grafts were inspected and were free from kinking or twisting. The distal and proximal anastomoses "~"appeared hemostatic. De-airing maneuvers were performed. Transesophageal echocardiography revealed no paravalvular leak and appropriate prosthetic function. Once de-airing was satisfactory, the root vent was removed. After verifying acceptable "~"parameters, we initiated weaning from cardiopulmonary bypass. Once we were off cardiopulmonary bypass, the venous cannulas was clamped and removed. A test dose of protamine was administered and the patient was monitored for any adverse reaction "~"before resuming protamine. Once half of the protamine dose was delivered, pump suckers were turned off and the systolic blood pressure was lowered for aortic decannulation. The aortic cannula was removed and pursestrings were tied down. All "~"cannulation sites were oversewn with a 4-0 prolene. The aortic line, proximal, and distal coronary anastomoses were hemostatic. The mammary bed was inspected and hemostasis was confirmed. Once the mediastinum was hemostatic, a 19Fr Chidi drain was "~"placed in the left pleural cavity and two 24Fr Chidi drains were placed within the pericardium. The sternum was approximated with four #7 single and three #8 double stainless steel wires. Fascia was approximated with #1 vicryl suture. The "~"subcutaneous, dermis and epidermis were closed in layers in a running fashion. The skin wound was cleansed and dressed. "~"All instrument, sponge, and needle counts were confirmed to be correct x 2 at the end of the operation. The patient was transferred to the cardiac intensive care unit in critical but stable condition."~"I, Dr. Guanaco Gunter, was present, scrubbed for, and performed all critical elements of this procedure."~"Guanaco Gunter MD, MS"~"Cardiothoracic Surgeon"~"[facility]"~"This operative dictation was created using the Dragon Medical One dictation system. Please excuse any grammatical, typographical, or 'sound alike' errors"

## 2024-08-26 NOTE — PTCARENOTE
"Pt received from CVOR at 1900; Sedated and intubated; NSR rhythm on monitor; VSS; Epicardial V-wires present with pacer settings 60/10.0/2.0; Weak DP and radial pulses present; Lungs diminished at bases; ETT size 8 positioned and secured at 22 cm "~"right lip; Ventilator settings A/C 16/450/5 FiO2 40%; CTx3 to -20 cm wall suction draining bloody drainage - no air leak, tidaling, or crepitus noted; Hypoactive BS; Anand catheter in place draining clear, yellow urine; Left Groin puncture site "~"ecchymotic, approximated, and CDI, Sternal Midline Incision approximated and CDI, B/L legs wrapped in Ace wrap - CDI; A-line in left brachial artery, [place] vishal floated to 40 in right Cordis - all lines zeroed and level; PIVx1 #20 L Hand; "~"Levo/insulin/precedex infusing - see nursing flowsheets for further details; 1 U PLTs ordered and given; iCal repleted x1; See nursing documentation for further details."~"CO: 3.87"~"CI: 2.05"~"SVR: 1033"

## 2024-08-26 NOTE — PN.DE.MGMTRT
"Insulin Management"~"- -"~"-: "~"8/26/2024: Diabetes Management F/U:"~"63 y/o pleasant woman who was transferred from [facility] with hx of MI/CAD S/P PCI stent to LAD in June 2024. Pt was in Cardiac rehab @ [facility] on Friday 8/16/24 when she developed 10/10 substernal chest pain with radiation to the neck/jaw--&gt; Cardiac cath--&gt; "~"extensive LAD disease--&gt; refereed to  for CABG."~"PMH: HTN, CAD/MI s/p PCI with VANESA to LAD, PAF S/P ablation, 2021, PSVT S/P ablation 2021, HLD, IDDMx 45 years and using the insulin pump x35 yrs, Diabetic neuropathy and Class 2 obesity (BMI 37.6). Routinely sees Endo @ [facility] Dr. Johnson"~"Currently using Medtronic 780 G Insulin pump with Guardian Sensor and NovoLog insulin and Vu advanced infusion set."~"Pt NPO after MN for OR- CABG today and is not available for interview at this time. "~"Pt was made aware of plans to stop the insulin pump morning of surgery and that her glucose would be managed using with continuous insulin infusion during surgery and 48 hrs post operatively, which she was agreeable to."~"Will cont to monitor and closely follow."~"Diabetes History"~"- -"~"Type of Diabetes: 2 requiring insulin"~"-: "~"Pre-Admission Diabetes Regimen"~"Lab Results"~"Hemoglobin A1c	 7.0 % (4.0-5.6)  H 	08/21/24  05:47    "~"Insulin Pump Settings"~"IP Diabetes Regimen"~" 	08/25/24	08/25/24	08/25/24"~" 	11:50	16:59	22:21"~"POC Glucose	 109 H	 103 H	 161 H"~" 	08/26/24"~" 	07:33"~"POC Glucose	 153 H"~"Patient Education"

## 2024-08-26 NOTE — PTCARENOTE
HISTORY OF PRESENT ILLNESS:   Sanam Tabor is a 78 y.o.  female who is s/p bilateral simple mastectomy with left axillary node dissection on 3/13/2014. 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 ER/VA positive and Her-2 negative. Ki67 was 41%. She did receive cytotoxic chemotherapy.       She has finally gotten over her DVT and pulmonary embolism. She is now on chronic anticoagulation with Eliquis. Her exercise tolerance and her shortness of breath are significantly improved over 6 months ago. She looks much better than the last year or so          She and her  had their month-long trip to [facility] and it was wonderful. .    They are now headed to [place] and [facility] soon    She is really doing much better and has minimal complaints at this time. We are seeing her  who did well with his gallbladder surgery but is still not eating as much as he did before. PHYSICAL EXAM:   The bilateral mastectomy and reconstruction incisions are looking good with no evidence of infection or recurrent tumor. There is no lymphedema on my exam today        IMPRESSION:   No evidence of disease after bilateral mastectomies and reconstruction  History of pulmonary embolus.      PLAN: Return in 6 months for a physical exam. She will call if there are any changes.          I have seen, examined and reviewed this patient medication list, appropriate labs and imaging studies. I reviewed relevant medical records and others physicians notes. I discussed the plans of care with the patient. I answered all the questions to the patients satisfaction. I, Dr Paulo Mitchell, personally performed the services described in this documentation as scribed by Evelina Aldridge MA in my presence and is both accurate and complete.      (Please note that portions of this note were completed with a voice recognition program. Efforts were made to edit the dictations but occasionally words are RT in room and pt placed on CPAP at 2335. ABG's due at 0005. Pt had a 8 beat run of SVT at 2336 that resolved on its own - CVPA Ed notified and aware. mis-transcribed.)  Over 50% of the total visit time of 25 minutes in face to face encounter with the patient, out of which more than 50% of the time was spent in counseling patient or family and coordination of care. Counseling included but was not limited to time spent reviewing labs, imaging studies/ treatment plan and answering questions.

## 2024-08-26 NOTE — W.CVOR.SURPR
"CVOR Surgeon Immed Pre Op"~"-"~"-: "~"I have examined this patient prior to performance of the scheduled procedure. "~"The patient's condition is unchanged from the time of the dictated/written History and "~"Physical and the patient is able to undergo the scheduled procedure. "~"  "~"CABG x 3 + LA MAZE + THUAN Clip +/- Biological AVR"~"Risk and benefits discussed with the patient over the weekend in person. "

## 2024-08-26 NOTE — PTCARENOTE
Pt. received at change of shift. Pt seen and assessed in room. Pt. AOx3, VS WNL., no complaints of pain at this time. CABG prep started, plan of care explained to patient. CABG scheduled for noon 8/26/24. Continuing prep and monitoring the patient.

## 2024-08-27 VITALS — SYSTOLIC BLOOD PRESSURE: 128 MMHG | RESPIRATION RATE: 21 BRPM | DIASTOLIC BLOOD PRESSURE: 66 MMHG

## 2024-08-27 VITALS — RESPIRATION RATE: 23 BRPM | SYSTOLIC BLOOD PRESSURE: 141 MMHG | DIASTOLIC BLOOD PRESSURE: 60 MMHG

## 2024-08-27 VITALS — RESPIRATION RATE: 13 BRPM

## 2024-08-27 VITALS — RESPIRATION RATE: 20 BRPM

## 2024-08-27 VITALS — RESPIRATION RATE: 25 BRPM | SYSTOLIC BLOOD PRESSURE: 132 MMHG | DIASTOLIC BLOOD PRESSURE: 65 MMHG

## 2024-08-27 VITALS — RESPIRATION RATE: 21 BRPM | SYSTOLIC BLOOD PRESSURE: 152 MMHG | DIASTOLIC BLOOD PRESSURE: 69 MMHG

## 2024-08-27 VITALS — RESPIRATION RATE: 22 BRPM | DIASTOLIC BLOOD PRESSURE: 63 MMHG | SYSTOLIC BLOOD PRESSURE: 131 MMHG

## 2024-08-27 VITALS — RESPIRATION RATE: 30 BRPM

## 2024-08-27 VITALS — SYSTOLIC BLOOD PRESSURE: 81 MMHG | RESPIRATION RATE: 18 BRPM | DIASTOLIC BLOOD PRESSURE: 42 MMHG

## 2024-08-27 VITALS — DIASTOLIC BLOOD PRESSURE: 73 MMHG | SYSTOLIC BLOOD PRESSURE: 102 MMHG

## 2024-08-27 VITALS — RESPIRATION RATE: 19 BRPM

## 2024-08-27 VITALS — RESPIRATION RATE: 18 BRPM

## 2024-08-27 VITALS — RESPIRATION RATE: 4 BRPM

## 2024-08-27 VITALS — RESPIRATION RATE: 21 BRPM

## 2024-08-27 VITALS — RESPIRATION RATE: 24 BRPM

## 2024-08-27 VITALS — DIASTOLIC BLOOD PRESSURE: 45 MMHG | RESPIRATION RATE: 22 BRPM | SYSTOLIC BLOOD PRESSURE: 85 MMHG

## 2024-08-27 VITALS — DIASTOLIC BLOOD PRESSURE: 49 MMHG | SYSTOLIC BLOOD PRESSURE: 141 MMHG | RESPIRATION RATE: 21 BRPM

## 2024-08-27 VITALS — DIASTOLIC BLOOD PRESSURE: 41 MMHG | SYSTOLIC BLOOD PRESSURE: 73 MMHG | RESPIRATION RATE: 19 BRPM

## 2024-08-27 VITALS — RESPIRATION RATE: 23 BRPM

## 2024-08-27 VITALS — RESPIRATION RATE: 21 BRPM | DIASTOLIC BLOOD PRESSURE: 67 MMHG | SYSTOLIC BLOOD PRESSURE: 141 MMHG

## 2024-08-27 VITALS — RESPIRATION RATE: 12 BRPM

## 2024-08-27 VITALS — RESPIRATION RATE: 21 BRPM | DIASTOLIC BLOOD PRESSURE: 69 MMHG | SYSTOLIC BLOOD PRESSURE: 131 MMHG

## 2024-08-27 VITALS — SYSTOLIC BLOOD PRESSURE: 118 MMHG | RESPIRATION RATE: 18 BRPM | DIASTOLIC BLOOD PRESSURE: 63 MMHG

## 2024-08-27 VITALS
RESPIRATION RATE: 19 BRPM | OXYGEN SATURATION: 95 % | SYSTOLIC BLOOD PRESSURE: 132 MMHG | HEART RATE: 84 BPM | DIASTOLIC BLOOD PRESSURE: 63 MMHG

## 2024-08-27 VITALS — SYSTOLIC BLOOD PRESSURE: 128 MMHG | DIASTOLIC BLOOD PRESSURE: 66 MMHG | RESPIRATION RATE: 22 BRPM

## 2024-08-27 VITALS — RESPIRATION RATE: 25 BRPM

## 2024-08-27 VITALS — RESPIRATION RATE: 22 BRPM

## 2024-08-27 VITALS — DIASTOLIC BLOOD PRESSURE: 52 MMHG | SYSTOLIC BLOOD PRESSURE: 121 MMHG

## 2024-08-27 VITALS — DIASTOLIC BLOOD PRESSURE: 62 MMHG | SYSTOLIC BLOOD PRESSURE: 128 MMHG | RESPIRATION RATE: 22 BRPM

## 2024-08-27 VITALS — RESPIRATION RATE: 15 BRPM

## 2024-08-27 VITALS — RESPIRATION RATE: 17 BRPM

## 2024-08-27 VITALS — RESPIRATION RATE: 21 BRPM | SYSTOLIC BLOOD PRESSURE: 146 MMHG | DIASTOLIC BLOOD PRESSURE: 67 MMHG

## 2024-08-27 VITALS — DIASTOLIC BLOOD PRESSURE: 49 MMHG | SYSTOLIC BLOOD PRESSURE: 96 MMHG | RESPIRATION RATE: 20 BRPM

## 2024-08-27 VITALS — RESPIRATION RATE: 14 BRPM

## 2024-08-27 VITALS — RESPIRATION RATE: 15 BRPM | DIASTOLIC BLOOD PRESSURE: 48 MMHG | SYSTOLIC BLOOD PRESSURE: 91 MMHG

## 2024-08-27 VITALS — SYSTOLIC BLOOD PRESSURE: 125 MMHG | DIASTOLIC BLOOD PRESSURE: 58 MMHG

## 2024-08-27 VITALS — DIASTOLIC BLOOD PRESSURE: 64 MMHG | RESPIRATION RATE: 21 BRPM | SYSTOLIC BLOOD PRESSURE: 121 MMHG

## 2024-08-27 VITALS — SYSTOLIC BLOOD PRESSURE: 90 MMHG | RESPIRATION RATE: 16 BRPM | DIASTOLIC BLOOD PRESSURE: 48 MMHG

## 2024-08-27 VITALS — RESPIRATION RATE: 11 BRPM

## 2024-08-27 VITALS — RESPIRATION RATE: 16 BRPM

## 2024-08-27 VITALS — SYSTOLIC BLOOD PRESSURE: 129 MMHG | DIASTOLIC BLOOD PRESSURE: 65 MMHG

## 2024-08-27 VITALS — DIASTOLIC BLOOD PRESSURE: 49 MMHG | RESPIRATION RATE: 22 BRPM | SYSTOLIC BLOOD PRESSURE: 144 MMHG

## 2024-08-27 VITALS — RESPIRATION RATE: 25 BRPM | DIASTOLIC BLOOD PRESSURE: 54 MMHG | SYSTOLIC BLOOD PRESSURE: 139 MMHG

## 2024-08-27 VITALS — DIASTOLIC BLOOD PRESSURE: 62 MMHG | SYSTOLIC BLOOD PRESSURE: 93 MMHG | RESPIRATION RATE: 22 BRPM

## 2024-08-27 VITALS — RESPIRATION RATE: 20 BRPM | DIASTOLIC BLOOD PRESSURE: 63 MMHG | SYSTOLIC BLOOD PRESSURE: 132 MMHG

## 2024-08-27 VITALS — DIASTOLIC BLOOD PRESSURE: 58 MMHG | SYSTOLIC BLOOD PRESSURE: 86 MMHG | RESPIRATION RATE: 12 BRPM

## 2024-08-27 VITALS — DIASTOLIC BLOOD PRESSURE: 76 MMHG | SYSTOLIC BLOOD PRESSURE: 122 MMHG | RESPIRATION RATE: 17 BRPM

## 2024-08-27 VITALS — RESPIRATION RATE: 6 BRPM

## 2024-08-27 VITALS — RESPIRATION RATE: 9 BRPM

## 2024-08-27 VITALS — RESPIRATION RATE: 10 BRPM

## 2024-08-27 LAB
ALBUMIN SERPL-MCNC: 2.6 G/DL (ref 3.5–5)
ALP SERPL-CCNC: 69 U/L (ref 38–126)
ALT SERPL-CCNC: 36 U/L (ref 0–35)
AST SERPL-CCNC: 96 U/L (ref 14–36)
BASE EXCESS BLDA CALC-SCNC: -2.9 MMOL/L
BUN SERPL-MCNC: 17 MG/DL (ref 7–17)
BUN SERPL-MCNC: 19 MG/DL (ref 7–17)
CALCIUM SERPL-MCNC: 8.6 MG/DL (ref 8.4–10.2)
CALCIUM SERPL-MCNC: 8.7 MG/DL (ref 8.4–10.2)
CHLORIDE SERPL-SCNC: 109 MMOL/L (ref 98–107)
CHLORIDE SERPL-SCNC: 112 MMOL/L (ref 98–107)
CO2 SERPL-SCNC: 24 MMOL/L (ref 22–30)
CO2 SERPL-SCNC: 24 MMOL/L (ref 22–30)
EGFR: > 60
EGFR: > 60
ERYTHROCYTE [DISTWIDTH] IN BLOOD BY AUTOMATED COUNT: 14.1 % (ref 11.5–14.5)
ESTIMATED CREATININE CLEARANCE: 59 ML/MIN
ESTIMATED CREATININE CLEARANCE: 72 ML/MIN
GAS FLOW.O2 O2 DELIVERY SYS: (no result) L/MIN
GLUCOSE - POINT OF CARE: 100 MG/DL (ref 70–99)
GLUCOSE - POINT OF CARE: 102 MG/DL (ref 70–99)
GLUCOSE - POINT OF CARE: 104 MG/DL (ref 70–99)
GLUCOSE - POINT OF CARE: 106 MG/DL (ref 70–99)
GLUCOSE - POINT OF CARE: 113 MG/DL (ref 70–99)
GLUCOSE - POINT OF CARE: 121 MG/DL (ref 70–99)
GLUCOSE - POINT OF CARE: 126 MG/DL (ref 70–99)
GLUCOSE - POINT OF CARE: 167 MG/DL (ref 70–99)
GLUCOSE - POINT OF CARE: 183 MG/DL (ref 70–99)
GLUCOSE - POINT OF CARE: 192 MG/DL (ref 70–99)
GLUCOSE - POINT OF CARE: 194 MG/DL (ref 70–99)
GLUCOSE - POINT OF CARE: 70 MG/DL (ref 70–99)
GLUCOSE - POINT OF CARE: 90 MG/DL (ref 70–99)
GLUCOSE - POINT OF CARE: 93 MG/DL (ref 70–99)
GLUCOSE SERPL-MCNC: 114 MG/DL (ref 70–99)
GLUCOSE SERPL-MCNC: 99 MG/DL (ref 70–99)
HCO3 BLDA-SCNC: 21.9 MMOL/L (ref 21–28)
HCT VFR BLD AUTO: 24.4 % (ref 37–47)
HCT VFR BLD AUTO: 27.7 % (ref 37–47)
HGB BLD-MCNC: 10 G/DL (ref 12–16)
HGB BLD-MCNC: 8.6 G/DL (ref 12–16)
MAGNESIUM SERPL-MCNC: 2 MG/DL (ref 1.6–2.3)
MCHC RBC AUTO-ENTMCNC: 36.1 G/DL (ref 33–37)
MCV RBC AUTO: 85 FL (ref 81–99)
PCO2 BLDA: 37 MMHG (ref 32–35)
PLATELET # BLD AUTO: 107 10^3/UL (ref 130–400)
PLATELET # BLD AUTO: 148 10^3/UL (ref 130–400)
PO2 BLDA: 148 MMHG (ref 83–108)
POTASSIUM BLDA-SCNC: 5.2 MMOL/L (ref 3.5–5.1)
POTASSIUM SERPL-SCNC: 4.7 MMOL/L (ref 3.5–5.1)
POTASSIUM SERPL-SCNC: 4.8 MMOL/L (ref 3.5–5.1)
PROT SERPL-MCNC: 4.4 G/DL (ref 6.3–8.2)
SAO2 % BLDA: 99.1 % (ref 94–98)
SODIUM SERPL-SCNC: 140 MMOL/L (ref 135–145)
SODIUM SERPL-SCNC: 146 MMOL/L (ref 135–145)

## 2024-08-27 RX ADMIN — ACETAMINOPHEN 1000 MG: 500 TABLET ORAL at 13:13

## 2024-08-27 RX ADMIN — AMIODARONE HYDROCHLORIDE 200 MG: 200 TABLET ORAL at 15:10

## 2024-08-27 RX ADMIN — CEFAZOLIN 5: 10 INJECTION, POWDER, FOR SOLUTION INTRAVENOUS at 07:44

## 2024-08-27 RX ADMIN — Medication 250: at 01:13

## 2024-08-27 RX ADMIN — INSULIN ASPART 4 UNITS: 100 INJECTION, SOLUTION INTRAVENOUS; SUBCUTANEOUS at 09:14

## 2024-08-27 RX ADMIN — ASPIRIN 81 MG: 81 TABLET, CHEWABLE ORAL at 07:47

## 2024-08-27 RX ADMIN — PANTOPRAZOLE SODIUM 40 MG: 40 TABLET, DELAYED RELEASE ORAL at 07:47

## 2024-08-27 RX ADMIN — SENNOSIDES AND DOCUSATE SODIUM 1 TABLET: 8.6; 5 TABLET ORAL at 07:48

## 2024-08-27 RX ADMIN — CALCIUM CHLORIDE 60 MG: 100 INJECTION INTRAVENOUS; INTRAVENTRICULAR at 04:49

## 2024-08-27 RX ADMIN — FUROSEMIDE 40 MG: 10 INJECTION, SOLUTION INTRAMUSCULAR; INTRAVENOUS at 17:49

## 2024-08-27 RX ADMIN — SODIUM FERRIC GLUCONATE COMPLEX IN SUCROSE 110 MG: 12.5 INJECTION INTRAVENOUS at 13:58

## 2024-08-27 RX ADMIN — GABAPENTIN 900 MG: 300 CAPSULE ORAL at 22:26

## 2024-08-27 RX ADMIN — HYDROMORPHONE HYDROCHLORIDE 0.25 MG: 0.25 INJECTION, SOLUTION INTRAMUSCULAR; INTRAVENOUS; SUBCUTANEOUS at 00:54

## 2024-08-27 RX ADMIN — HYDROMORPHONE HYDROCHLORIDE 0.25 MG: 0.25 INJECTION, SOLUTION INTRAMUSCULAR; INTRAVENOUS; SUBCUTANEOUS at 03:54

## 2024-08-27 RX ADMIN — INSULIN ASPART: 100 INJECTION, SOLUTION INTRAVENOUS; SUBCUTANEOUS at 11:48

## 2024-08-27 RX ADMIN — LIDOCAINE 1 PATCH: 4 PATCH TOPICAL at 07:44

## 2024-08-27 RX ADMIN — SENNOSIDES AND DOCUSATE SODIUM 1 TABLET: 8.6; 5 TABLET ORAL at 20:18

## 2024-08-27 RX ADMIN — AMIODARONE HYDROCHLORIDE 200 MG: 200 TABLET ORAL at 22:26

## 2024-08-27 RX ADMIN — ATORVASTATIN CALCIUM 40 MG: 40 TABLET, FILM COATED ORAL at 17:17

## 2024-08-27 RX ADMIN — CEFAZOLIN 5: 10 INJECTION, POWDER, FOR SOLUTION INTRAVENOUS at 00:52

## 2024-08-27 RX ADMIN — HYDROMORPHONE HYDROCHLORIDE 0.25 MG: 0.25 INJECTION, SOLUTION INTRAMUSCULAR; INTRAVENOUS; SUBCUTANEOUS at 07:43

## 2024-08-27 RX ADMIN — ALBUMIN (HUMAN) 100: 0.25 INJECTION, SOLUTION INTRAVENOUS at 11:58

## 2024-08-27 RX ADMIN — ACETAMINOPHEN 1000 MG: 500 TABLET ORAL at 22:27

## 2024-08-27 RX ADMIN — CEFAZOLIN 5: 10 INJECTION, POWDER, FOR SOLUTION INTRAVENOUS at 15:10

## 2024-08-27 RX ADMIN — SODIUM CHLORIDE: 900 INJECTION, SOLUTION INTRAVENOUS at 07:46

## 2024-08-27 RX ADMIN — AMIODARONE HYDROCHLORIDE 200 MG: 200 TABLET ORAL at 07:47

## 2024-08-27 RX ADMIN — Medication 500 MG: at 20:18

## 2024-08-27 RX ADMIN — EZETIMIBE 10 MG: 10 TABLET ORAL at 17:17

## 2024-08-27 RX ADMIN — FERROUS SULFATE TAB 325 MG (65 MG ELEMENTAL FE) 325 MG: 325 (65 FE) TAB at 07:47

## 2024-08-27 RX ADMIN — MUPIROCIN 1 APPLIC: 20 OINTMENT TOPICAL at 07:37

## 2024-08-27 RX ADMIN — CLOPIDOGREL BISULFATE 75 MG: 75 TABLET ORAL at 07:46

## 2024-08-27 RX ADMIN — HYDROMORPHONE HYDROCHLORIDE 0.25 MG: 0.25 INJECTION, SOLUTION INTRAMUSCULAR; INTRAVENOUS; SUBCUTANEOUS at 15:13

## 2024-08-27 RX ADMIN — Medication 500 MG: at 07:47

## 2024-08-27 RX ADMIN — OXYCODONE HYDROCHLORIDE 5 MG: 5 TABLET ORAL at 20:18

## 2024-08-27 RX ADMIN — HYDROMORPHONE HYDROCHLORIDE 0.25 MG: 0.25 INJECTION, SOLUTION INTRAMUSCULAR; INTRAVENOUS; SUBCUTANEOUS at 12:08

## 2024-08-27 RX ADMIN — Medication 500 MG: at 07:46

## 2024-08-27 RX ADMIN — ALBUMIN (HUMAN) 100: 0.25 INJECTION, SOLUTION INTRAVENOUS at 20:17

## 2024-08-27 RX ADMIN — Medication 50 MCG: at 07:48

## 2024-08-27 RX ADMIN — MUPIROCIN 1 APPLIC: 20 OINTMENT TOPICAL at 20:18

## 2024-08-27 RX ADMIN — ACETAMINOPHEN 1000 MG: 500 TABLET ORAL at 05:34

## 2024-08-27 RX ADMIN — SODIUM BICARBONATE 50 MEQ: 84 INJECTION INTRAVENOUS at 00:30

## 2024-08-27 RX ADMIN — ASPIRIN 81 MG: 81 TABLET, CHEWABLE ORAL at 00:52

## 2024-08-27 RX ADMIN — METOPROLOL TARTRATE 12.5 MG: 25 TABLET, FILM COATED ORAL at 20:18

## 2024-08-27 RX ADMIN — INSULIN ASPART: 100 INJECTION, SOLUTION INTRAVENOUS; SUBCUTANEOUS at 17:17

## 2024-08-27 NOTE — PTCARENOTE
no urine output. flushed catheter and bladder scanned for nothing. IV lasix given as ordered for no urine output.

## 2024-08-27 NOTE — PTCARENOTE
OOB to chair for lunch with assist x2. tolerated well. VSS. Remains on 2L o2 for known apical pneumo. no SOB noted by patient. will continue to monitor.

## 2024-08-27 NOTE — PTCARENOTE
"Resumed care of patient from previous RN. VSS. resting in bed at time of assessment.  Drowsy but arousable. NSR rhythm on monitor. HR 90s. VSS Epicardial V-wires present set to back up rate of 60/10/2. Pulses weak. +1-2 gen edema. Lungs diminished "~"at baseson 2L nc.  at best. Medicated for pain. CTx3 to -20 cm wall suction draining serosang. - no air leak, tidaling, or crepitus noted. Hypoactive BS. tolerating clears. Anand catheter in place draining clear, yellow urine. minimal amount "~"reported to PA. All surgical sites c/d/i. left brachial carmela present, as well as usual lines. PIVx1 #20. insulin infusing per glycemic protocol. Levo weaned off at time of shift change. will continue to montior."

## 2024-08-27 NOTE — W.PN.CD
"Today's Communication / Plan"~"-"~"-: "~"Remains in sinus"~"Patient had previously been on sotalol 40 mg twice daily preoperatively currently on amiodarone postoperatively and metoprolol. "~"Will review post op plans with EP"~"Resume anticoasgulation when safe post op (patient was on Xarelto ). Patietn currently on ASA and Plavix. Last stent in June. Will review plans for antiplatelt therapy with CT surgery and interventional"~"Impression / Plan"~"-"~"-: "~"CAD, pending CABG/AVR/MAZE Monday 8/26:"~"	-severe CAD with hx multiple stents (most recent June 2024), now requiring surgery.  "~"	-continue ASA and statin"~"HFpEF, CAD/ AS"~"	-Intra op GEORGETTE suggested normal ventricular function will monitor."~"Postop anemia.  Monitor."~"Type I DM:"~"	-A1c 7.0"~"	-on insulin pump"~"	-diabetic NP consulted"~"PAF:"~"	-hx PVI"~"	-on sotalol as OP ( 40mgBID) -now on amiodarone and beta-blocker postoperatively"~"	-Xarelto held for surgery- resume OAC when safe post-op.  [id number] score is at least 4  "~"Dyslipidemia"~"	-Resume atorvastatin and ezetimibe postop"~"TONO:"~"	-hasn't been on CPAP after hers was recalled"~"	-this should be reassessed in future"~"Physical Exam"~"Vital Signs/Labs"~"-: "~"Vital Signs"~"Temp	Pulse	Resp	BP	Pulse Ox"~" 98 F 	 78 	 21 	 141/67 	 98 "~" 08/27/24 15:30	 08/27/24 17:49	 08/27/24 17:15	 08/27/24 17:49	 08/27/24 15:30"~"	08/26/24	08/27/24	08/28/24"~"	06:59	06:59	06:59"~"Actual Weight	91.1 kg	95.9 kg	"~"08/27/24 03:44 "~"08/27/24 10:16 "~"PT	 18.5 Sec (11.4-14.6)  H 	08/26/24  19:01    "~"INR	 1.56  	08/26/24  19:01    "~"APTT	 37.2 Sec (23.4-35.0)  H 	08/26/24  19:01    "~"Magnesium	 2.0 mg/dl (1.6-2.3)  	08/27/24  03:44    "~"Triglycerides	 58 mg/dl ()  	08/22/24  05:29    "~"LDL Cholesterol, Calc	 35 mg/dl 	08/22/24  05:29    "~"VLDL Cholesterol, Calc	 11 mg/dl (0-30)  	08/22/24  05:29    "~"HDL Cholesterol	 38 mg/dl 	08/22/24  05:29    "~"Physical Exam"~"Constitutional: No acute distress"~"Cardiovascular: Rhythm & rate is regular"~"Respiratory: Wheeze Absent and Rhonchi Absent"~"GI: Soft"~"Neuro/Psych: Alert"~"Data Reviewed"~"-"~"-: "~"Date of Service:  August 27, 2024"~"Medical Decision Making: Reviewed Test Results"~"Echo: Report Reviewed by me"~"X-Ray/CT/US/MRI/NUC/PET: Report Reviewed by me"~"Labs: Labs Reviewed by me"

## 2024-08-27 NOTE — PTCARENOTE
"assumed care of pt from previous RN. pt A&Ox4, resting in chair at time of assessment. pt noted to have flat affect. SR on tele-monitor. temp epicardial v-wires insulated. palpable peripheral pulses. generalized anasarca noted. +1 pitting edema in "~"b/l LE. POX 95% on 2 L NC. CTx3 (mediastinal x2, pleural x1) to -20cm wall suction, draining sanguineous drainage. abd s/n, round, obese. denney catheter draining clear, yellow urine. all surgical sites stable, CDI. R IJ cordis w/ KVO. CVP hooked up- "~"leveled, zeroed, flushed. PIV intact. see worklist for complete nursing assessment, interventions, VS, and I&Os. "

## 2024-08-27 NOTE — PTCARENOTE
"1 U PRBC's ordered and given - VSS throughout and no blood transfusion reactions noted. Pt's CI and BP's improved after transfusion - levo weaned down to 3 mcg/min and CI 1.96. Spoke with CVPA Ed regarding CI and magnesium levels - no need to for "~"dobutamine or IV magnesium repletion at this time. Additional IV calcium chloride ordered"

## 2024-08-27 NOTE — PN.DE.MGMTRT
"Insulin Management"~"- -"~"-: "~"8/27/2024: Diabetes Management Follow up:"~"Patient transferred from [facility] with hx of MI/CAD S/P PCI stent to LAD in June 2024. Pt was in Cardiac rehab @ [facility] on Friday 8/16/24 when she developed 10/10 substernal chest pain with radiation to the neck/jaw--&gt; Cardiac cath--&gt; extensive LAD "~"disease--&gt; refereed to  for CABG."~"PMH: HTN, CAD/MI s/p PCI with VANESA to LAD, PAF S/P ablation, 2021, PSVT S/P ablation 2021, HLD, IDDMx 44 years and using the insulin pump x35 yrs, Diabetic neuropathy and Class 2 obesity (BMI 37.6). Routinely sees Endo @ [facility] Dr. Johnson"~"Currently using Medtronic 780 G Insulin pump with Guardian Sensor and NovoLog insulin and Oxford advanced infusion set."~"Patient awake alert and oriented in bed.  Able to discuss diabetes management plan."~"POD 1 s/p CABG x 3"~"Currently receiving glycemic protocol IV insulin requiring .6 to 2.3 units insulin per hour.  Will continue insulin infusion today and assess for readiness to transition back to insulin pump late tomorrow.  Patient states she has supplies."~"Diabetes History"~"- -"~"Type of Diabetes: 1"~"-: "~"Pre-Admission Diabetes Regimen"~" 	08/26/24	08/27/24"~" 	19:01	03:44"~"Creatinine	 0.7	 0.8"~"Lab Results"~"Hemoglobin A1c	 7.0 % (4.0-5.6)  H 	08/21/24  05:47    "~"Insulin Pump Settings"~"IP Diabetes Regimen"~" 	08/26/24	08/26/24	08/26/24"~" 	19:01	19:10	20:03"~"Glucose	 81		"~"POC Glucose		 86	 134 H"~" 	08/26/24	08/26/24	08/26/24"~" 	21:04	22:30	23:09"~"Glucose			"~"POC Glucose	 133 H	 121 H	 120 H"~" 	08/27/24	08/27/24	08/27/24"~" 	00:09	02:08	03:44"~"Glucose			 114 H"~"POC Glucose	 106 H	 90	"~" 	08/27/24	08/27/24	08/27/24"~" 	04:22	06:11	08:21"~"Glucose			"~"POC Glucose	 126 H	 102 H	 100 H"~"Meal type:                    	Dinner                                          	"~"Patient Education"

## 2024-08-27 NOTE — RESPNOTE
pt extubated at 0040 without incident, and placed on 6l/m NC.  VSS, Sao2 98% HR 98, RR 12.  IS done with pt. Pt achieved 500 x 3 with good effort, reinforcement needed. RN is aware.  Will continue to monitor

## 2024-08-27 NOTE — W.PN.CT
"Today's Communication / Plan"~"-"~"-: "~"Plan:"~"-No major issues overnight. Hemodynamically and neurologically intact"~"-Successfully extubated on 8/27/24 @ 0040"~"-On Levophed gtt weaned to off @ 0550, on insulin gtt per protocol"~"-Last CI 1.96-&gt; 1.62; MVO2 61.3%,  U/O since OR (has been out for 12hrs now) 400 mL "~"-Cont. current meds (ASA, Plavix, Zetia; holding AM BB given postop hypotension. Will resume DOAC on POD#4 and likely d/c ASA or Plavix at that time)"~"-D/C swan and a-line today"~"-D/C denney today "~"-Telemetry phase today once off insulin gtt, will resume insulin pump once off insulin gtt. Diabetes management following"~"-Monitor chest tube drainage: 2Meds 310/310 (12hrs)"~"-Maintain cordis"~"-Maintain temporary V wires (will pull before d/c home)"~"-OOB into chair/Ambulate "~"Assessment / Plan"~"-"~"-: "~"Assessment:"~"- Standard sternotomy with aortic and right atrial cannulation/CABG x 3 (In situ LIMA to distal LAD, Ao to RSVG to proximal diagonal, Ao to RSVG to distal RCA)/Endoscopic harvest of bilateral lower extremities ultimately using the left lower "~"extremity vein/Surgical aortic valve replacement (21 mm bioprosthesis)/Modified posterior wall isolation, left atrial maze using RF ablation/Left atrial appendage exclusion (35 mm clip)/Debridement of significant calcifications around the ostium of "~"the left and right coronary, by Dr. Gunter 8/26/24, pod#1"~"-Multivessel coronary artery disease status post multiple PCI and stenting with in-stent restenosis for previous myocardial infarction"~"-Moderate aortic valve stenosis with severe mitral annular calcification and calcified root"~"-LVEF 60-65 preop, 70-75% postop per intraop GEORGETTE"~"-Hx MI/CAD S/P PCI with VANESA to LAD in June 2024"~"-[place]"~"-T2DM since age of 19, on insulin pump with hgb A1C of  7.2         "~"-Diabetic neuropathy       "~"-PAF S/P ablation, 2021"~"-PSVT S/P ablation, 2021"~"-HTN"~"-HLD"~"-Class 2 obesity (BMI 37.6)"~"-TONO (not on CPAP/BIPAP)"~"-Preop E.Coli UTI (treated with Augmentin)"~"-S/P b/l cataracts"~"-S/p Repair of retinal detachment, 2021"~"-S/P b/l carpal tunnel "~"-S/p Cholecystectomy "~"-S/P Appendectomy"~"-S/P Left TKA "~"-S/p Release of trigger finger "~"-S/P Gissell-en-Y gastric bypass surgery noted on CT"~"-Acute postop blood loss/Anemia (received 2u PRBC's intraop and 1u PRBC postop)"~"-Acute postop thrombocytopenia (transfused 1 {5pk} plts)"~"-Acute postop atelectasis"~"-Acute postop hypovolemia with subsequent hypervolemia"~"Discussed patient care with: Cardiology, Nursing, Respiratory Therapy, Pharmacy and Care Team"~"Subjective"~"Procedure"~"-: "~"- Standard sternotomy with aortic and right atrial cannulation/CABG x 3 (In situ LIMA to distal LAD, Ao to RSVG to proximal diagonal, Ao to RSVG to distal RCA)/Endoscopic harvest of bilateral lower extremities ultimately using the left lower "~"extremity vein/Surgical aortic valve replacement (21 mm bioprosthesis)/Modified posterior wall isolation, left atrial maze using RF ablation/Left atrial appendage exclusion (35 mm clip)/Debridement of significant calcifications around the ostium of "~"the left and right coronary, by Dr. Gunter 8/26/24"~"-"~"-: "~"Date of Service:  August 27, 2024"~"Pt c/o mild incisional pain, otherwise feels well"~"Objective Data"~"-"~"-: "~"PT	 18.5 Sec (11.4-14.6)  H 	08/26/24  19:01    "~"INR	 1.56  	08/26/24  19:01    "~"APTT	 37.2 Sec (23.4-35.0)  H 	08/26/24  19:01    "~"Vital Signs"~"-: "~"Vital Signs"~"Temp	Pulse	Resp	BP	Pulse Ox"~" 99.9 F 	 96 	 19 	 73/41 	 100 "~" 08/27/24 00:00	 08/27/24 00:15	 08/27/24 00:00	 08/27/24 00:00	 08/27/24 00:15"~"CT Intake/Output/Weight"~"	08/26/24	08/26/24	08/27/24"~"	06:59	18:59	06:59"~"Intake Total	100 / 580		1030.9 / 1030.9"~"Output Total	400 / 400		455 / 455"~"Balance	-300 / 180		575.9 / 575.9"~"SaO2: 100 (2L)"~"Physical Exam"~"-"~"General: Awake, Oriented and AOx3"~"Cardiovascular: Regular rate & rhythm, No Murmurs, No Rub and No Gallop"~"Respiratory: Decreased Breath Sounds (at bases)"~"Sternum: Stable"~"Incision: Clean, Dry, Intact and Dressing Intact"~"Extremities: Other (trace edema)"~"Data Reviewed"~"-"~"Lab Results: Results Reviewed"~"Medications: Active Meds Reviewed"~"Chest X-Ray: Report Reviewed and Image Reviewed"~"ECG: Report Reviewed and Image Reviewed"

## 2024-08-27 NOTE — PTCARENOTE
CVPA Ed notified of low CI 1.62 - no dobutamine orders at this time; Anand catheter and [place] vishal to remain for now; levo off at 0550 and restarted at 0615 at 1 mcg/min due to low MAP's.

## 2024-08-28 VITALS — SYSTOLIC BLOOD PRESSURE: 113 MMHG | DIASTOLIC BLOOD PRESSURE: 52 MMHG

## 2024-08-28 VITALS — RESPIRATION RATE: 21 BRPM | DIASTOLIC BLOOD PRESSURE: 54 MMHG | SYSTOLIC BLOOD PRESSURE: 134 MMHG

## 2024-08-28 VITALS — RESPIRATION RATE: 16 BRPM

## 2024-08-28 VITALS — RESPIRATION RATE: 16 BRPM | SYSTOLIC BLOOD PRESSURE: 99 MMHG | DIASTOLIC BLOOD PRESSURE: 41 MMHG

## 2024-08-28 VITALS — RESPIRATION RATE: 25 BRPM | DIASTOLIC BLOOD PRESSURE: 55 MMHG | SYSTOLIC BLOOD PRESSURE: 130 MMHG

## 2024-08-28 VITALS — SYSTOLIC BLOOD PRESSURE: 93 MMHG | DIASTOLIC BLOOD PRESSURE: 44 MMHG

## 2024-08-28 VITALS — DIASTOLIC BLOOD PRESSURE: 45 MMHG | SYSTOLIC BLOOD PRESSURE: 104 MMHG | RESPIRATION RATE: 16 BRPM

## 2024-08-28 VITALS — RESPIRATION RATE: 18 BRPM

## 2024-08-28 VITALS — DIASTOLIC BLOOD PRESSURE: 31 MMHG | SYSTOLIC BLOOD PRESSURE: 105 MMHG

## 2024-08-28 VITALS — DIASTOLIC BLOOD PRESSURE: 52 MMHG | RESPIRATION RATE: 25 BRPM | SYSTOLIC BLOOD PRESSURE: 126 MMHG

## 2024-08-28 VITALS — SYSTOLIC BLOOD PRESSURE: 133 MMHG | DIASTOLIC BLOOD PRESSURE: 56 MMHG | RESPIRATION RATE: 18 BRPM

## 2024-08-28 VITALS — DIASTOLIC BLOOD PRESSURE: 47 MMHG | SYSTOLIC BLOOD PRESSURE: 110 MMHG

## 2024-08-28 VITALS — RESPIRATION RATE: 21 BRPM | SYSTOLIC BLOOD PRESSURE: 124 MMHG | DIASTOLIC BLOOD PRESSURE: 56 MMHG

## 2024-08-28 VITALS — DIASTOLIC BLOOD PRESSURE: 57 MMHG | SYSTOLIC BLOOD PRESSURE: 134 MMHG | RESPIRATION RATE: 22 BRPM

## 2024-08-28 VITALS — SYSTOLIC BLOOD PRESSURE: 112 MMHG | DIASTOLIC BLOOD PRESSURE: 46 MMHG | RESPIRATION RATE: 16 BRPM

## 2024-08-28 VITALS — SYSTOLIC BLOOD PRESSURE: 106 MMHG | DIASTOLIC BLOOD PRESSURE: 56 MMHG | RESPIRATION RATE: 16 BRPM

## 2024-08-28 VITALS — RESPIRATION RATE: 23 BRPM | SYSTOLIC BLOOD PRESSURE: 124 MMHG | DIASTOLIC BLOOD PRESSURE: 54 MMHG

## 2024-08-28 VITALS — DIASTOLIC BLOOD PRESSURE: 47 MMHG | SYSTOLIC BLOOD PRESSURE: 135 MMHG

## 2024-08-28 VITALS — SYSTOLIC BLOOD PRESSURE: 118 MMHG | DIASTOLIC BLOOD PRESSURE: 55 MMHG

## 2024-08-28 VITALS — SYSTOLIC BLOOD PRESSURE: 133 MMHG | DIASTOLIC BLOOD PRESSURE: 56 MMHG

## 2024-08-28 VITALS — DIASTOLIC BLOOD PRESSURE: 58 MMHG | SYSTOLIC BLOOD PRESSURE: 100 MMHG | RESPIRATION RATE: 16 BRPM

## 2024-08-28 VITALS — SYSTOLIC BLOOD PRESSURE: 115 MMHG | RESPIRATION RATE: 16 BRPM | DIASTOLIC BLOOD PRESSURE: 53 MMHG

## 2024-08-28 VITALS — SYSTOLIC BLOOD PRESSURE: 108 MMHG | DIASTOLIC BLOOD PRESSURE: 48 MMHG

## 2024-08-28 VITALS — SYSTOLIC BLOOD PRESSURE: 127 MMHG | DIASTOLIC BLOOD PRESSURE: 76 MMHG

## 2024-08-28 LAB
BUN SERPL-MCNC: 29 MG/DL (ref 7–17)
BUN SERPL-MCNC: 31 MG/DL (ref 7–17)
BUN SERPL-MCNC: 34 MG/DL (ref 7–17)
CALCIUM SERPL-MCNC: 8.2 MG/DL (ref 8.4–10.2)
CALCIUM SERPL-MCNC: 8.7 MG/DL (ref 8.4–10.2)
CALCIUM SERPL-MCNC: 9.7 MG/DL (ref 8.4–10.2)
CHLORIDE SERPL-SCNC: 101 MMOL/L (ref 98–107)
CHLORIDE SERPL-SCNC: 103 MMOL/L (ref 98–107)
CHLORIDE SERPL-SCNC: 99 MMOL/L (ref 98–107)
CO2 SERPL-SCNC: 26 MMOL/L (ref 22–30)
CO2 SERPL-SCNC: 26 MMOL/L (ref 22–30)
CO2 SERPL-SCNC: 29 MMOL/L (ref 22–30)
EGFR: 45.63
EGFR: 50.23
EGFR: 50.23
ERYTHROCYTE [DISTWIDTH] IN BLOOD BY AUTOMATED COUNT: 14.5 % (ref 11.5–14.5)
ERYTHROCYTE [DISTWIDTH] IN BLOOD BY AUTOMATED COUNT: 14.7 % (ref 11.5–14.5)
ERYTHROCYTE [DISTWIDTH] IN BLOOD BY AUTOMATED COUNT: 15 % (ref 11.5–14.5)
ESTIMATED CREATININE CLEARANCE: 46 ML/MIN
ESTIMATED CREATININE CLEARANCE: 49 ML/MIN
ESTIMATED CREATININE CLEARANCE: 49 ML/MIN
GLUCOSE - POINT OF CARE: 105 MG/DL (ref 70–99)
GLUCOSE - POINT OF CARE: 110 MG/DL (ref 70–99)
GLUCOSE - POINT OF CARE: 111 MG/DL (ref 70–99)
GLUCOSE - POINT OF CARE: 128 MG/DL (ref 70–99)
GLUCOSE - POINT OF CARE: 129 MG/DL (ref 70–99)
GLUCOSE - POINT OF CARE: 131 MG/DL (ref 70–99)
GLUCOSE - POINT OF CARE: 132 MG/DL (ref 70–99)
GLUCOSE - POINT OF CARE: 133 MG/DL (ref 70–99)
GLUCOSE - POINT OF CARE: 291 MG/DL (ref 70–99)
GLUCOSE - POINT OF CARE: 328 MG/DL (ref 70–99)
GLUCOSE - POINT OF CARE: 84 MG/DL (ref 70–99)
GLUCOSE SERPL-MCNC: 181 MG/DL (ref 70–99)
GLUCOSE SERPL-MCNC: 249 MG/DL (ref 70–99)
GLUCOSE SERPL-MCNC: 99 MG/DL (ref 70–99)
HCT VFR BLD AUTO: 20.4 % (ref 37–47)
HCT VFR BLD AUTO: 21.1 % (ref 37–47)
HCT VFR BLD AUTO: 24.9 % (ref 37–47)
HGB BLD-MCNC: 7.1 G/DL (ref 12–16)
HGB BLD-MCNC: 7.3 G/DL (ref 12–16)
HGB BLD-MCNC: 8.7 G/DL (ref 12–16)
MAGNESIUM SERPL-MCNC: 2.1 MG/DL (ref 1.6–2.3)
MAGNESIUM SERPL-MCNC: 2.2 MG/DL (ref 1.6–2.3)
MCHC RBC AUTO-ENTMCNC: 34.6 G/DL (ref 33–37)
MCHC RBC AUTO-ENTMCNC: 34.8 G/DL (ref 33–37)
MCHC RBC AUTO-ENTMCNC: 34.9 G/DL (ref 33–37)
MCV RBC AUTO: 86.5 FL (ref 81–99)
MCV RBC AUTO: 86.8 FL (ref 81–99)
MCV RBC AUTO: 87.2 FL (ref 81–99)
PLATELET # BLD AUTO: 100 10^3/UL (ref 130–400)
PLATELET # BLD AUTO: 93 10^3/UL (ref 130–400)
PLATELET # BLD AUTO: 95 10^3/UL (ref 130–400)
POTASSIUM SERPL-SCNC: 3.5 MMOL/L (ref 3.5–5.1)
POTASSIUM SERPL-SCNC: 4.4 MMOL/L (ref 3.5–5.1)
POTASSIUM SERPL-SCNC: 4.5 MMOL/L (ref 3.5–5.1)
SODIUM SERPL-SCNC: 137 MMOL/L (ref 135–145)
SODIUM SERPL-SCNC: 137 MMOL/L (ref 135–145)
SODIUM SERPL-SCNC: 139 MMOL/L (ref 135–145)

## 2024-08-28 RX ADMIN — INSULIN ASPART: 100 INJECTION, SOLUTION INTRAVENOUS; SUBCUTANEOUS at 12:46

## 2024-08-28 RX ADMIN — SODIUM CHLORIDE 500: 900 INJECTION, SOLUTION INTRAVENOUS at 18:08

## 2024-08-28 RX ADMIN — AMIODARONE HYDROCHLORIDE 200 MG: 200 TABLET ORAL at 16:25

## 2024-08-28 RX ADMIN — SODIUM FERRIC GLUCONATE COMPLEX IN SUCROSE 110 MG: 12.5 INJECTION INTRAVENOUS at 14:49

## 2024-08-28 RX ADMIN — INSULIN ASPART 4 UNITS: 100 INJECTION, SOLUTION INTRAVENOUS; SUBCUTANEOUS at 08:15

## 2024-08-28 RX ADMIN — ATORVASTATIN CALCIUM 40 MG: 40 TABLET, FILM COATED ORAL at 18:06

## 2024-08-28 RX ADMIN — GABAPENTIN 900 MG: 300 CAPSULE ORAL at 22:26

## 2024-08-28 RX ADMIN — PANTOPRAZOLE SODIUM 40 MG: 40 TABLET, DELAYED RELEASE ORAL at 09:05

## 2024-08-28 RX ADMIN — CALCIUM GLUCONATE 100: 10 INJECTION, SOLUTION INTRAVENOUS at 13:45

## 2024-08-28 RX ADMIN — LIDOCAINE 1 PATCH: 4 PATCH TOPICAL at 09:06

## 2024-08-28 RX ADMIN — Medication 500 MG: at 09:04

## 2024-08-28 RX ADMIN — DEXTROSE MONOHYDRATE 12.5 GRAMS: 25 INJECTION, SOLUTION INTRAVENOUS at 12:42

## 2024-08-28 RX ADMIN — METOPROLOL TARTRATE 12.5 MG: 25 TABLET, FILM COATED ORAL at 09:04

## 2024-08-28 RX ADMIN — ALBUMIN (HUMAN) 100: 0.25 INJECTION, SOLUTION INTRAVENOUS at 03:50

## 2024-08-28 RX ADMIN — OXYCODONE HYDROCHLORIDE 5 MG: 5 TABLET ORAL at 02:11

## 2024-08-28 RX ADMIN — SENNOSIDES AND DOCUSATE SODIUM 1 TABLET: 8.6; 5 TABLET ORAL at 19:57

## 2024-08-28 RX ADMIN — AMIODARONE HYDROCHLORIDE 200 MG: 200 TABLET ORAL at 09:05

## 2024-08-28 RX ADMIN — CYCLOBENZAPRINE HYDROCHLORIDE 5 MG: 10 TABLET, FILM COATED ORAL at 05:04

## 2024-08-28 RX ADMIN — HUMAN INSULIN 100: 100 INJECTION, SOLUTION SUBCUTANEOUS at 08:19

## 2024-08-28 RX ADMIN — SENNOSIDES AND DOCUSATE SODIUM 1 TABLET: 8.6; 5 TABLET ORAL at 09:05

## 2024-08-28 RX ADMIN — FERROUS SULFATE TAB 325 MG (65 MG ELEMENTAL FE) 325 MG: 325 (65 FE) TAB at 09:05

## 2024-08-28 RX ADMIN — ASPIRIN 81 MG: 81 TABLET, CHEWABLE ORAL at 09:05

## 2024-08-28 RX ADMIN — CLOPIDOGREL BISULFATE 75 MG: 75 TABLET ORAL at 09:05

## 2024-08-28 RX ADMIN — Medication 500 MG: at 19:57

## 2024-08-28 RX ADMIN — ACETAMINOPHEN 1000 MG: 500 TABLET ORAL at 14:49

## 2024-08-28 RX ADMIN — ACETAMINOPHEN 1000 MG: 500 TABLET ORAL at 05:04

## 2024-08-28 RX ADMIN — METOPROLOL TARTRATE 12.5 MG: 25 TABLET, FILM COATED ORAL at 19:57

## 2024-08-28 RX ADMIN — MUPIROCIN 1 APPLIC: 20 OINTMENT TOPICAL at 19:58

## 2024-08-28 RX ADMIN — Medication 50 MCG: at 09:05

## 2024-08-28 RX ADMIN — ACETAMINOPHEN 1000 MG: 500 TABLET ORAL at 22:27

## 2024-08-28 RX ADMIN — POTASSIUM CHLORIDE 40 MEQ: 1500 TABLET, EXTENDED RELEASE ORAL at 23:18

## 2024-08-28 RX ADMIN — Medication 500 MG: at 09:05

## 2024-08-28 RX ADMIN — FUROSEMIDE 60 MG: 10 INJECTION, SOLUTION INTRAMUSCULAR; INTRAVENOUS at 08:15

## 2024-08-28 RX ADMIN — AMIODARONE HYDROCHLORIDE 200 MG: 200 TABLET ORAL at 22:27

## 2024-08-28 RX ADMIN — EZETIMIBE 10 MG: 10 TABLET ORAL at 18:06

## 2024-08-28 RX ADMIN — MUPIROCIN 1 APPLIC: 20 OINTMENT TOPICAL at 09:06

## 2024-08-28 NOTE — W.PN.CD
"Today's Communication / Plan"~"-"~"-: "~"Repeat CBC."~"If drop in Hbg is true, would transfuse 2 units."~"Aggressively diurese in the context of worsening anemia (somewhat dilutional), rising creatinine and a &gt; 5 kg weight gain from baseline."~"Impression / Plan"~"-"~"-: "~"Impression/Plan:  65 y/o female with IDDM1, PAF s/p prior PVI, HLD, untreated TONO, HFpEF, moderate AS and CAD s/p recent PCI (June 2024) admitted with unstable angina, now s/p CABG/AVR/MAZE/LAAE."~"#CAD/Unstable Angina"~"	-Acute on chronic."~"	-Prior PCI to LAD."~"	-S/P 3V CABG (LIMA to LAD, SVG to D1, SVG to dRCA) with Dr. Gunter, 8/26/2024."~"	-Routine post operative management."~"	-Encourage incentive spirometry, ambulation."~"	-Aggressive secondary prevention, including high dose, high potency statin."~"	-Continue post operative amiodarone."~"	-Continue metoprolol."~"#Moderate AS"~"	-Chronic."~"	-S/P #21 Rodriguez Inspiris Resilia AVR (SN [id number]) with Dr. Gunter, 8/26/2024."~"	-Post operative care as above."~"#PAF"~"	-Currently in NSR."~"	-Rhythm control via prior remote PVI, now S/P MAZE with Dr. Gunter, 8/26/2024."~"	-Rate control with amiodarone and metoprolol."~"	-CHADS2-Vasc = 6 (CHF, HTN, Age x1, DM, Vascular Disease, Female)."~"	-Therapeutic anticoagulation on hold."~"#HFpEF"~"	-Acute on chronic."~"	-Agree with diuresis."~"	-Consider uptitration of GDMT (including SGLT2i)."~"#Anemia"~"	-Acute blood loss anemia secondary to surgery."~"	-S/P 2 units PRBC's and 1 unit of platelets in surgery."~"	-S/P 1 unit PRBC's on 8/27/2024 @ 01:05."~"	-Hbg dropped to 7.3 this morning (pan cellular drop)."~"	-Repeat CBC.  If true decrease in Hbg, would transfuse 2 units and aggressively diurese."~"#Type I DM"~"	-Chronic."~"	-HbA1c = 7.0%."~"	-Continue insulin pump."~"	-Diabetic NP consulted."~"#Dyslipidemia"~"	-Chronic, stable."~"	-Resume atorvastatin and ezetimibe postop."~"#TONO"~"	-Chronic, untreated."~"	-Has not been on CPAP after hers was recalled."~"	-Outpatient reassessment."~"Subjective/Interval History:"~"Weight is up 5.4 kg from oseas (95.9 kg &lt;-- 90.5 kg)."~"Furosemide 40 mg IV given yesterday."~"SaO2 92% on 2LNC."~"Additional furosemide 60 mg IV given this morning."~"Hbg dropped to 7.3 this morning."~"Platelets dropped to 93k."~"Creatinine up to 1.2."~"DATA:"~"Intraoperative GEORGETTE, 8/26/2024:"~"CONCLUSIONS"~" Moderate aortic stenosis.  Mild aortic regurgitation.  Valve annulus 21 mm."~" Normal left ventricular size and systolic function, mild LVH, stage I diastolic "~" function."~" No thrombus seen at left atrial appendage."~" The aorta has atheroma less than 5 mm and moderate calcifications."~" Severe MAC, trace MR."~" Moderate left atrial enlargement."~"POST OPERATIVE FINDINGS"~" Status post aortic valve replacement with 21 mm bioprosthetic aortic valve, the "~" valve is well-seated, no perivalvular leak, leaflets are functioning well.  No "~" intra valvular regurgitation.  Gradient could not be obtained because no "~" transgastric views were possible."~" "~" Status post CABG x 3, the left ventricle is vigorously emily, EF 70 to "~" 75%.  No regional wall motion abnormalities seen."~" "~" Status post left atrial appendage exclusion, no flow seen through the left "~" atrial appendage remnant."~" "~" Trace MR.  Trace TR."~" "~" Normal right ventricular function."~"Physical Exam"~"Vital Signs/Labs"~"-: "~"Vital Signs"~"Temp	Pulse	Resp	BP	Pulse Ox"~" 37.8 C 	 84 	 25 	 110/47 	 93 "~" 08/28/24 04:57	 08/28/24 07:00	 08/28/24 05:00	 08/28/24 07:00	 08/28/24 07:00"~"	08/26/24	08/27/24	08/28/24"~"	11:59	11:59	11:59"~"Actual Weight	90.5 kg	95.9 kg	95.9 kg"~"08/28/24 03:58 "~"08/28/24 03:58 "~"PT	 18.5 Sec (11.4-14.6)  H 	08/26/24  19:01    "~"INR	 1.56  	08/26/24  19:01    "~"APTT	 37.2 Sec (23.4-35.0)  H 	08/26/24  19:01    "~"Magnesium	 2.2 mg/dl (1.6-2.3)  	08/28/24  03:58    "~"Triglycerides	 58 mg/dl ()  	08/22/24  05:29    "~"LDL Cholesterol, Calc	 35 mg/dl 	08/22/24  05:29    "~"VLDL Cholesterol, Calc	 11 mg/dl (0-30)  	08/22/24  05:29    "~"HDL Cholesterol	 38 mg/dl 	08/22/24  05:29    "~"Physical Exam"~"Constitutional: No acute distress and Comfortable"~"EENT: Anicteric and Moist mucous membranes"~"Cardiovascular: Rhythm & rate is regular, Pedal edema present, S1S2 is normal and Murmur/rub/gallop absent"~"Respiratory: Respiratory effort normal and Other (Decreased throughout.)"~"GI: Soft, Distention absent, Flat, Non tender and Normal bowel sounds"~"Neuro/Psych: AO x 3"~"Data Reviewed"~"-"~"-: "~"Date of Service:  August 28, 2024"~"Medical Decision Making: Reviewed Test Results, Independent Historian Assessment, Test Interpretation and Review of Case with other Provider"~"EKG: Tracing Personally Visualized and interpreted and Report Reviewed by me"~"Echo: Report Reviewed by me"~"X-Ray/CT/US/MRI/NUC/PET: Image Personally Visualized and interpreted and Report Reviewed by me"~"Labs: Labs Reviewed by me"

## 2024-08-28 NOTE — PTCARENOTE
PRBC transfusion initiated as ordered. Pt's BG with pre meal check was 47, hypoglycemia tx provided as ordered, recheck was 133. Pt is eating lunch.

## 2024-08-28 NOTE — PN.DE.MGMTRT
"Insulin Management"~"- -"~"-: "~"8/28/2024: Diabetes Management Follow up:"~"Patient transferred from [facility] with hx of MI/CAD S/P PCI stent to LAD in June 2024. Pt was in Cardiac rehab @ [facility] on Friday 8/16/24 when she developed 10/10 substernal chest pain with radiation to the neck/jaw--&gt; Cardiac cath--&gt; extensive LAD "~"disease--&gt; refereed to  for CABG."~"PMH: HTN, CAD/MI s/p PCI with VANESA to LAD, PAF S/P ablation, 2021, PSVT S/P ablation 2021, HLD, IDDMx 45 years and using the insulin pump x35 yrs, Diabetic neuropathy and Class 2 obesity (BMI 37.6). Routinely sees Endo @ [facility] Dr. Johnson"~"Currently using Medtronic 780 G Insulin pump with Guardian Sensor and NovoLog insulin and Loyal advanced infusion set."~"Patient awake alert and oriented in bed.  Able to discuss diabetes management plan."~"POD 2 s/p CABG x 3 doing well, cr 1.2, eGFR 50.23."~"Currently receiving glycemic protocol IV insulin requiring .7 to 3 units insulin per hour.  Will continue insulin infusion until after lunch then transition back to insulin pump.  Patient states she has supplies and feels confident she can insert "~"new infusion set.  Once infusion set is inserted after lunch glycemic protocol to continue for one hour then be stopped.  Patient to bolus for meals and corrections."~"Diabetes History"~"- -"~"Type of Diabetes: 1"~"-: "~"Pre-Admission Diabetes Regimen"~" 	08/27/24	08/28/24"~" 	10:16	03:58"~"Creatinine	 1.0	 1.2 H"~"Lab Results"~"Hemoglobin A1c	 7.0 % (4.0-5.6)  H 	08/21/24  05:47    "~"Insulin Pump Settings"~"IP Diabetes Regimen"~" 	08/27/24	08/27/24	08/27/24"~" 	08:21	10:12	10:16"~"Glucose			 99"~"POC Glucose	 100 H	 113 H	"~" 	08/27/24	08/27/24	08/27/24"~" 	11:48	13:02	14:00"~"Glucose			"~"POC Glucose	 70	 93	 183 H"~" 	08/27/24	08/27/24	08/27/24"~" 	15:04	17:14	20:10"~"Glucose			"~"POC Glucose	 121 H	 104 H	 192 H"~" 	08/27/24	08/27/24	08/28/24"~" 	21:27	22:23	00:15"~"Glucose			"~"POC Glucose	 194 H	 167 H	 129 H"~" 	08/28/24	08/28/24	08/28/24"~" 	02:08	03:58	04:56"~"Glucose		 99	"~"POC Glucose	 110 H	 111 H	 105 H"~" 	08/28/24	08/28/24	08/28/24"~" 	05:58	07:08	08:08"~"Glucose			"~"POC Glucose	 84	 131 H	 132 H"~"Meal type:                    	Dinner                                          	"~"Amount consumed:              	90%                                             	"~"Patient Education"

## 2024-08-28 NOTE — W.PN.CT
"Today's Communication / Plan"~"-"~"-: "~"-pod #2"~"-no issues overnight"~"-CT output: 2 meds 120/270 and L pleur 35/70 in 12/24 hrs. No air leak noted"~"-small R ptx on CXR 8/27-? resolved today. Follow Radiology report"~"-wt is up 11 lbs on 8/27 from preop. Got 40 iv Lasix last night - /825 in 12/24 hrs. Consider bid 40 iv Lasix"~"-Cr trended up - 1.2 today (1.0 on 8/27, 0.7 preop) "~"-h/h 7.3/21.1 today (10.0 on 8/27 after 1 pRBC for hg 8.6; 9.5 preop)"~"-follow platelets - 93K today (107-148 on 8/27, 117 on 8/26)"~"-current meds (ASA, Plavix, Lipitor, Zetia, Lopressor 12.5 bid, Amio 200 tid, Protonix). Preop was on Sotalol 40 bid "~"-encourage IS, OOB"~"Assessment / Plan"~"-"~"-: "~"Assessment:"~"- Standard sternotomy with aortic and right atrial cannulation/CABG x 3 (In situ LIMA to distal LAD, Ao to RSVG to proximal diagonal, Ao to RSVG to distal RCA)/Endoscopic harvest of bilateral lower extremities ultimately using the left lower "~"extremity vein/Surgical aortic valve replacement (21 mm bioprosthesis)/Modified posterior wall isolation, left atrial maze using RF ablation/Left atrial appendage exclusion (35 mm clip)/Debridement of significant calcifications around the ostium of "~"the left and right coronary, by Dr. Gunter 8/26/24, pod#2"~"-Multivessel coronary artery disease status post multiple PCI and stenting with in-stent restenosis for previous myocardial infarction"~"-Moderate aortic valve stenosis with severe mitral annular calcification and calcified root"~"-LVEF 60-65 preop, 70-75% postop per intraop GEORGETTE"~"-Hx MI/CAD S/P PCI with VANESA to LAD in June 2024"~"-[place]"~"-T2DM since age of 19, on insulin pump with hgb A1C of  7.2         "~"-Diabetic neuropathy       "~"-PAF S/P ablation, 2021"~"-PSVT S/P ablation, 2021"~"-Loop recorder on CXR"~"-HTN"~"-HLD"~"-Class 2 obesity (BMI 37.6)"~"-TONO (not on CPAP/BIPAP)"~"-Preop E.Coli UTI (treated with Augmentin)"~"-S/P b/l cataracts"~"-S/p Repair of retinal detachment, 2021"~"-S/P b/l carpal tunnel "~"-S/p Cholecystectomy "~"-S/P Appendectomy"~"-S/P Left TKA "~"-S/p Release of trigger finger "~"-S/P Gissell-en-Y gastric bypass surgery noted on CT"~"-Acute postop blood loss/Anemia (received 2u PRBC's intraop and 1u PRBC postop)"~"-Acute postop thrombocytopenia (transfused 1 {5pk} plts)"~"-Acute postop atelectasis"~"-Acute postop hypovolemia with subsequent hypervolemia"~"-GRAY"~"Discussed patient care with: Nursing and Care Team"~"Subjective"~"Procedure"~"-: "~"- Standard sternotomy with aortic and right atrial cannulation/CABG x 3 (In situ LIMA to distal LAD, Ao to RSVG to proximal diagonal, Ao to RSVG to distal RCA)/Endoscopic harvest of bilateral lower extremities ultimately using the left lower "~"extremity vein/Surgical aortic valve replacement (21 mm bioprosthesis)/Modified posterior wall isolation, left atrial maze using RF ablation/Left atrial appendage exclusion (35 mm clip)/Debridement of significant calcifications around the ostium of "~"the left and right coronary, by Dr. Gunter 8/26/24"~"-"~"-: "~"Date of Service:  August 28, 2024"~"Objective Data"~"-"~"-: "~"Lab Results"~"08/28/24 03:58 "~"08/28/24 03:58 "~"PT	 18.5 Sec (11.4-14.6)  H 	08/26/24  19:01    "~"INR	 1.56  	08/26/24  19:01    "~"APTT	 37.2 Sec (23.4-35.0)  H 	08/26/24  19:01    "~"Vital Signs"~"-: "~"Vital Signs"~"Temp	Pulse	Resp	BP	Pulse Ox"~" 100.1 F 	 82 	 25 	 126/52 	 91 "~" 08/28/24 04:57	 08/28/24 04:00	 08/28/24 04:00	 08/28/24 04:00	 08/28/24 04:57"~"CT Intake/Output/Weight"~"	08/27/24	08/27/24	08/28/24"~"	06:59	18:59	06:59"~"Intake Total	1650.1 / 1650.1	626.2 / 845.9	219.7 / 845.9"~"Output Total	775 / 775	325 / 1165	840 / 1165"~"Balance	875.1 / 875.1	301.2 / -319.1	-620.3 / -319.1"~"SaO2: 91"~"Physical Exam"~"-"~"General: Awake and AOx3"~"Cardiovascular: Regular rate & rhythm, No Murmurs and No Rub"~"Respiratory: Decreased Breath Sounds"~"Sternum: Stable"~"Incision: Clean, Dry and Dressing Intact"~"Extremities: Edema +1 (2+ DPs b/l)"~"-: "~"Abdomen: soft, nontender, nondistended, + bowel sounds"~"Data Reviewed"~"-"~"Lab Results: Results Reviewed"~"Medications: Active Meds Reviewed"~"Chest X-Ray: Report Reviewed and Image Reviewed"~"ECG: Report Reviewed and Image Reviewed"

## 2024-08-28 NOTE — PTCARENOTE
Accu check was 328: Patients own insulin pump reading 251. Radha CULLEN aware. New orders received: will obtain BMP to check glucose and lytes/K large diuresis from bumex gtt.

## 2024-08-28 NOTE — PTCARENOTE
"assumed care of pt from previous shift RN, sinus rhythm on tele w PACs, /56, + peripheral pulses, +1 edema to bilateral lower extremities, epicardial V wire insulated. Lungs diminished, pox 92% on 2L NC, coughing and deep breathing encouraged. "~"+bs, tolerating PO intake, denney catheter draining moe. Post op surgical sites stable, CT x3 draining minimal drainage. Right IJ cordis w KvO infusing, PIV flushes easily. plan of care reviewed w the pt and questions encouraged. "~"DRIPS:"~"	Insulin titrated per glycemic protocol "

## 2024-08-29 VITALS — SYSTOLIC BLOOD PRESSURE: 98 MMHG | DIASTOLIC BLOOD PRESSURE: 46 MMHG

## 2024-08-29 VITALS — SYSTOLIC BLOOD PRESSURE: 122 MMHG | DIASTOLIC BLOOD PRESSURE: 57 MMHG

## 2024-08-29 VITALS — DIASTOLIC BLOOD PRESSURE: 50 MMHG | SYSTOLIC BLOOD PRESSURE: 130 MMHG

## 2024-08-29 VITALS — DIASTOLIC BLOOD PRESSURE: 47 MMHG | SYSTOLIC BLOOD PRESSURE: 115 MMHG | RESPIRATION RATE: 16 BRPM

## 2024-08-29 VITALS — DIASTOLIC BLOOD PRESSURE: 53 MMHG | SYSTOLIC BLOOD PRESSURE: 140 MMHG

## 2024-08-29 VITALS — DIASTOLIC BLOOD PRESSURE: 52 MMHG | SYSTOLIC BLOOD PRESSURE: 131 MMHG

## 2024-08-29 VITALS — DIASTOLIC BLOOD PRESSURE: 47 MMHG | SYSTOLIC BLOOD PRESSURE: 125 MMHG

## 2024-08-29 VITALS — SYSTOLIC BLOOD PRESSURE: 129 MMHG | DIASTOLIC BLOOD PRESSURE: 54 MMHG | RESPIRATION RATE: 18 BRPM

## 2024-08-29 VITALS — RESPIRATION RATE: 16 BRPM | SYSTOLIC BLOOD PRESSURE: 107 MMHG | DIASTOLIC BLOOD PRESSURE: 80 MMHG

## 2024-08-29 VITALS — HEART RATE: 71 BPM | OXYGEN SATURATION: 100 % | SYSTOLIC BLOOD PRESSURE: 118 MMHG | DIASTOLIC BLOOD PRESSURE: 48 MMHG

## 2024-08-29 VITALS — DIASTOLIC BLOOD PRESSURE: 35 MMHG | SYSTOLIC BLOOD PRESSURE: 115 MMHG

## 2024-08-29 VITALS — DIASTOLIC BLOOD PRESSURE: 53 MMHG | SYSTOLIC BLOOD PRESSURE: 140 MMHG | RESPIRATION RATE: 20 BRPM

## 2024-08-29 VITALS — DIASTOLIC BLOOD PRESSURE: 51 MMHG | SYSTOLIC BLOOD PRESSURE: 127 MMHG

## 2024-08-29 VITALS — RESPIRATION RATE: 16 BRPM

## 2024-08-29 VITALS — DIASTOLIC BLOOD PRESSURE: 54 MMHG | SYSTOLIC BLOOD PRESSURE: 129 MMHG

## 2024-08-29 VITALS — DIASTOLIC BLOOD PRESSURE: 58 MMHG | SYSTOLIC BLOOD PRESSURE: 122 MMHG

## 2024-08-29 VITALS — RESPIRATION RATE: 18 BRPM | SYSTOLIC BLOOD PRESSURE: 122 MMHG | DIASTOLIC BLOOD PRESSURE: 57 MMHG

## 2024-08-29 VITALS — SYSTOLIC BLOOD PRESSURE: 112 MMHG | DIASTOLIC BLOOD PRESSURE: 51 MMHG

## 2024-08-29 VITALS — DIASTOLIC BLOOD PRESSURE: 59 MMHG | SYSTOLIC BLOOD PRESSURE: 132 MMHG

## 2024-08-29 VITALS — SYSTOLIC BLOOD PRESSURE: 118 MMHG | DIASTOLIC BLOOD PRESSURE: 48 MMHG

## 2024-08-29 VITALS — SYSTOLIC BLOOD PRESSURE: 139 MMHG | DIASTOLIC BLOOD PRESSURE: 98 MMHG

## 2024-08-29 VITALS — RESPIRATION RATE: 17 BRPM

## 2024-08-29 LAB
BUN SERPL-MCNC: 32 MG/DL (ref 7–17)
CALCIUM SERPL-MCNC: 8.4 MG/DL (ref 8.4–10.2)
CHLORIDE SERPL-SCNC: 101 MMOL/L (ref 98–107)
CO2 SERPL-SCNC: 32 MMOL/L (ref 22–30)
EGFR: 55.76
ERYTHROCYTE [DISTWIDTH] IN BLOOD BY AUTOMATED COUNT: 14.3 % (ref 11.5–14.5)
ESTIMATED CREATININE CLEARANCE: 54 ML/MIN
GLUCOSE - POINT OF CARE: 110 MG/DL (ref 70–99)
GLUCOSE - POINT OF CARE: 145 MG/DL (ref 70–99)
GLUCOSE - POINT OF CARE: 158 MG/DL (ref 70–99)
GLUCOSE - POINT OF CARE: 78 MG/DL (ref 70–99)
GLUCOSE SERPL-MCNC: 84 MG/DL (ref 70–99)
HCT VFR BLD AUTO: 24.8 % (ref 37–47)
HGB BLD-MCNC: 8.6 G/DL (ref 12–16)
MAGNESIUM SERPL-MCNC: 2.2 MG/DL (ref 1.6–2.3)
MCHC RBC AUTO-ENTMCNC: 34.7 G/DL (ref 33–37)
MCV RBC AUTO: 85.8 FL (ref 81–99)
PLATELET # BLD AUTO: 94 10^3/UL (ref 130–400)
POTASSIUM SERPL-SCNC: 3.9 MMOL/L (ref 3.5–5.1)
SODIUM SERPL-SCNC: 139 MMOL/L (ref 135–145)

## 2024-08-29 RX ADMIN — AMIODARONE HYDROCHLORIDE 200 MG: 200 TABLET ORAL at 08:05

## 2024-08-29 RX ADMIN — Medication 500 MG: at 08:05

## 2024-08-29 RX ADMIN — EZETIMIBE 10 MG: 10 TABLET ORAL at 17:40

## 2024-08-29 RX ADMIN — ACETAMINOPHEN 1000 MG: 500 TABLET ORAL at 21:33

## 2024-08-29 RX ADMIN — ACETAMINOPHEN 1000 MG: 500 TABLET ORAL at 06:08

## 2024-08-29 RX ADMIN — ACETAMINOPHEN 1000 MG: 500 TABLET ORAL at 14:06

## 2024-08-29 RX ADMIN — LIDOCAINE: 4 PATCH TOPICAL at 08:17

## 2024-08-29 RX ADMIN — Medication 500 MG: at 21:26

## 2024-08-29 RX ADMIN — MUPIROCIN 1 APPLIC: 20 OINTMENT TOPICAL at 21:33

## 2024-08-29 RX ADMIN — SENNOSIDES AND DOCUSATE SODIUM 1 TABLET: 8.6; 5 TABLET ORAL at 21:26

## 2024-08-29 RX ADMIN — BUMETANIDE 1 MG: 0.25 INJECTION, SOLUTION INTRAMUSCULAR; INTRAVENOUS at 09:17

## 2024-08-29 RX ADMIN — SOTALOL HYDROCHLORIDE 40 MG: 80 TABLET ORAL at 21:26

## 2024-08-29 RX ADMIN — FERROUS SULFATE TAB 325 MG (65 MG ELEMENTAL FE) 325 MG: 325 (65 FE) TAB at 08:05

## 2024-08-29 RX ADMIN — OXYCODONE HYDROCHLORIDE 5 MG: 5 TABLET ORAL at 03:51

## 2024-08-29 RX ADMIN — CLOPIDOGREL BISULFATE 75 MG: 75 TABLET ORAL at 08:06

## 2024-08-29 RX ADMIN — MUPIROCIN 1 APPLIC: 20 OINTMENT TOPICAL at 08:06

## 2024-08-29 RX ADMIN — SODIUM CHLORIDE: 900 INJECTION, SOLUTION INTRAVENOUS at 17:40

## 2024-08-29 RX ADMIN — ATORVASTATIN CALCIUM 40 MG: 40 TABLET, FILM COATED ORAL at 17:40

## 2024-08-29 RX ADMIN — GABAPENTIN 900 MG: 300 CAPSULE ORAL at 21:34

## 2024-08-29 RX ADMIN — AMIODARONE HYDROCHLORIDE 200 MG: 200 TABLET ORAL at 17:40

## 2024-08-29 RX ADMIN — OXYCODONE HYDROCHLORIDE 5 MG: 5 TABLET ORAL at 23:06

## 2024-08-29 RX ADMIN — METOPROLOL TARTRATE 12.5 MG: 25 TABLET, FILM COATED ORAL at 08:05

## 2024-08-29 RX ADMIN — PANTOPRAZOLE SODIUM 40 MG: 40 TABLET, DELAYED RELEASE ORAL at 08:05

## 2024-08-29 RX ADMIN — Medication 50 MCG: at 08:05

## 2024-08-29 RX ADMIN — SENNOSIDES AND DOCUSATE SODIUM 1 TABLET: 8.6; 5 TABLET ORAL at 08:05

## 2024-08-29 RX ADMIN — ASPIRIN 81 MG: 81 TABLET, CHEWABLE ORAL at 08:06

## 2024-08-29 RX ADMIN — SODIUM FERRIC GLUCONATE COMPLEX IN SUCROSE 110 MG: 12.5 INJECTION INTRAVENOUS at 14:06

## 2024-08-29 RX ADMIN — ACETAZOLAMIDE 250 MG: 250 TABLET ORAL at 08:06

## 2024-08-29 NOTE — PN.DE.MGMTRT
"Insulin Management"~"- -"~"-: "~"8/29/2024: Diabetes Management Follow up:"~"Patient transferred from [facility] with hx of MI/CAD S/P PCI stent to LAD in June 2024. Pt was in Cardiac rehab @ [facility] on Friday 8/16/24 when she developed 10/10 substernal chest pain with radiation to the neck/jaw--&gt; Cardiac cath--&gt; extensive LAD "~"disease--&gt; refereed to  for CABG."~"PMH: HTN, CAD/MI s/p PCI with VANESA to LAD, PAF S/P ablation, 2021, PSVT S/P ablation 2021, HLD, IDDMx 45 years and using the insulin pump x35 yrs, Diabetic neuropathy and Class 2 obesity (BMI 37.6). Routinely sees Endo @ [facility] Dr. Johnson"~"Currently using Medtronic 780 G Insulin pump with Guardian Sensor and NovoLog insulin and [place] advanced infusion set."~"Patient awake alert out of bed in chair.  Able to discuss diabetes management plan."~"POD 3 s/p CABG x 3 doing well, cr 1.1, eGFR 55.76."~"Transitioned from glycemic protocol after lunch 8/28 to insulin pump.  At the time of transition patients glucose had trended down to 47, she was treated and glucose 133.  Pre dinner and HS elevated, corrected using insulin pump.  Fasting glucose "~"8/29 84.  Patient is doing well, will make no change to insulin pump."~"Diabetes History"~"- -"~"Type of Diabetes: 1"~"-: "~"Pre-Admission Diabetes Regimen"~" 	08/28/24	08/28/24	08/28/24"~" 	14:48	22:29	22:29"~"Creatinine	 1.3 H	 1.2 H	 Cancelled"~" 	08/29/24"~" 	04:57"~"Creatinine	 1.1 H"~"Lab Results"~"Hemoglobin A1c	 7.0 % (4.0-5.6)  H 	08/21/24  05:47    "~"Insulin Pump Settings"~"IP Diabetes Regimen"~" 	08/28/24	08/28/24	08/28/24"~" 	08:08	10:39	12:37"~"Glucose			"~"POC Glucose	 132 H	 128 H	 47 L*"~" 	08/28/24	08/28/24	08/28/24"~" 	12:54	14:48	18:04"~"Glucose		 181 H	"~"POC Glucose	 133 H		 291 H"~" 	08/28/24	08/28/24	08/28/24"~" 	22:18	22:29	22:29"~"Glucose		 249 H	 Cancelled"~"POC Glucose	 328 H		"~" 	08/29/24	08/29/24"~" 	04:57	07:52"~"Glucose	 84	"~"POC Glucose		 78"~"Meal type:                    	Breakfast                                       	"~"Patient Education"

## 2024-08-29 NOTE — W.PN.CD
"Today's Communication / Plan"~"-"~"-: "~"Continue current course."~"Monitor response to bumetanide."~"Restart bumetanide gtt if intermittent dose response is inadequate."~"Impression / Plan"~"-"~"-: "~"Impression/Plan:  63 y/o female with IDDM1, PAF s/p prior PVI, HLD, untreated TONO, HFpEF, moderate AS and CAD s/p recent PCI (June 2024) admitted with unstable angina, now s/p CABG/AVR/MAZE/LAAE."~"#CAD/Unstable Angina"~"	-Acute on chronic."~"	-Prior PCI to LAD."~"	-S/P 3V CABG (LIMA to LAD, SVG to D1, SVG to dRCA) with Dr. Gunter, 8/26/2024."~"	-Routine post operative management."~"	-Encourage incentive spirometry, ambulation."~"	-Aggressive secondary prevention, including high dose, high potency statin."~"	-Continue post operative amiodarone."~"	-Continue metoprolol."~"#Moderate AS"~"	-Chronic."~"	-S/P #21 Rodriguez Inspiris Resilia AVR (SN [id number]) with Dr. Gunter, 8/26/2024."~"	-Post operative care as above."~"#PAF"~"	-Currently in NSR."~"	-Rhythm control via prior remote PVI, now S/P MAZE with Dr. Gunter, 8/26/2024."~"	-Rate control with amiodarone and metoprolol."~"	-CHADS2-Vasc = 6 (CHF, HTN, Age x1, DM, Vascular Disease, Female)."~"	-Therapeutic anticoagulation on hold."~"#HFpEF"~"	-Acute on chronic."~"	-Consider uptitration of GDMT (including SGLT2i)."~"	-Bumetanide gtt auto-discontinued."~"	-Give bumetanide 1 mg IV this morning and monitor response."~"	-If she responds well, continue intermittent dosing.  If she does not, restart bumetanide gtt."~"#Anemia"~"	-Acute blood loss anemia secondary to surgery."~"	-S/P 2 units PRBC's and 1 unit of platelets in surgery."~"	-S/P 1 unit PRBC's on 8/27/2024 @ 01:05."~"	-S/P 1 additional unit on 8/28/2024."~"	-Hbg improved to 8.6 (from 7.1)."~"#Type I DM"~"	-Chronic."~"	-HbA1c = 7.0%."~"	-Continue insulin pump."~"	-Diabetic NP consulted."~"#Dyslipidemia"~"	-Chronic, stable."~"	-Resume atorvastatin and ezetimibe postop."~"#TONO"~"	-Chronic, untreated."~"	-Has not been on CPAP after hers was recalled."~"	-Outpatient reassessment."~"Subjective/Interval History:"~"Transfused one unit of PRBC's yesterday."~"Furosemide transitioned to bumetanide gtt."~"Unfortunately the gtt auto-stopped last night."~"Regardless, her output was dramatically improved on this medication."~"DATA:"~"Intraoperative GEORGETTE, 8/26/2024:"~"CONCLUSIONS"~" Moderate aortic stenosis.  Mild aortic regurgitation.  Valve annulus 21 mm."~" Normal left ventricular size and systolic function, mild LVH, stage I diastolic "~" function."~" No thrombus seen at left atrial appendage."~" The aorta has atheroma less than 5 mm and moderate calcifications."~" Severe MAC, trace MR."~" Moderate left atrial enlargement."~"POST OPERATIVE FINDINGS"~" Status post aortic valve replacement with 21 mm bioprosthetic aortic valve, the "~" valve is well-seated, no perivalvular leak, leaflets are functioning well.  No "~" intra valvular regurgitation.  Gradient could not be obtained because no "~" transgastric views were possible."~" "~" Status post CABG x 3, the left ventricle is vigorously emily, EF 70 to "~" 75%.  No regional wall motion abnormalities seen."~" "~" Status post left atrial appendage exclusion, no flow seen through the left "~" atrial appendage remnant."~" "~" Trace MR.  Trace TR."~" "~" Normal right ventricular function."~"Physical Exam"~"Vital Signs/Labs"~"-: "~"Vital Signs"~"Temp	Pulse	Resp	BP	Pulse Ox"~" 36.6 C 	 73 	 18 	 122/57 	 93 "~" 08/29/24 04:54	 08/29/24 06:00	 08/29/24 04:54	 08/29/24 04:54	 08/29/24 06:03"~"	08/27/24	08/28/24	08/29/24"~"	11:59	11:59	11:59"~"Actual Weight	95.9 kg	95.9 kg	94.6 kg"~"08/29/24 04:57 "~"08/29/24 04:57 "~"PT	 18.5 Sec (11.4-14.6)  H 	08/26/24  19:01    "~"INR	 1.56  	08/26/24  19:01    "~"APTT	 37.2 Sec (23.4-35.0)  H 	08/26/24  19:01    "~"Magnesium	 2.2 mg/dl (1.6-2.3)  	08/29/24  04:57    "~"Triglycerides	 58 mg/dl ()  	08/22/24  05:29    "~"LDL Cholesterol, Calc	 35 mg/dl 	08/22/24  05:29    "~"VLDL Cholesterol, Calc	 11 mg/dl (0-30)  	08/22/24  05:29    "~"HDL Cholesterol	 38 mg/dl 	08/22/24  05:29    "~"Physical Exam"~"Constitutional: No acute distress and Comfortable"~"EENT: Anicteric and Moist mucous membranes"~"Cardiovascular: Rhythm & rate is regular, Pedal edema present, S1S2 is normal and Murmur/rub/gallop absent"~"Respiratory: Respiratory effort normal, Lungs clear to auscul., Wheeze Absent, Crackles Absent and Rhonchi Absent"~"GI: Soft, Distention absent, Flat, Non tender and Normal bowel sounds"~"Neuro/Psych: AO x 3"~"Data Reviewed"~"-"~"-: "~"Date of Service:  August 29, 2024"~"Medical Decision Making: Reviewed Test Results, Independent Historian Assessment and Test Interpretation"~"EKG: Tracing Personally Visualized and interpreted and Report Reviewed by me"~"Echo: Report Reviewed by me"~"X-Ray/CT/US/MRI/NUC/PET: Image Personally Visualized and interpreted and Report Reviewed by me"~"Labs: Labs Reviewed by me"

## 2024-08-29 NOTE — PTCARENOTE
assumed care of pt from previous shift RN, sinus rhythm maintained, VSS. +bm and +voided yellow. Post op sites are intact. Insulin pump maintained. Plan of care reviewed w the pt and questions encouraged

## 2024-08-29 NOTE — W.PN.CT
"Today's Communication / Plan"~"-"~"-: "~"-pod #3"~"-no issues overnight"~"-got 1 pRBC on 8/28 for Hg 7.1, followed by aggressive diuresis with 60 iv Lasix and Bumex drip. UO 2100/3425 in 12/24 hrs. H/h today 8.6/24.8"~"-Cr improved - 1.1 today (1.2 on 8/28, peak 1.3 on 8/27, 0.7 preop). Bicarb is elevated at 32"~"-CT output 60/70 in 12/24 hrs"~"-follow K with diuresis"~"-current meds (ASA, Plavix, Lipitor, Zetia, Lopressor 12.5 bid, Amio 200 tid, Protonix). Preop was on Sotalol 40 bid "~"-encourage IS, OOB"~"Assessment / Plan"~"-"~"-: "~"Assessment:"~"- Standard sternotomy with aortic and right atrial cannulation/CABG x 3 (In situ LIMA to distal LAD, Ao to RSVG to proximal diagonal, Ao to RSVG to distal RCA)/Endoscopic harvest of bilateral lower extremities ultimately using the left lower "~"extremity vein/Surgical aortic valve replacement (21 mm bioprosthesis)/Modified posterior wall isolation, left atrial maze using RF ablation/Left atrial appendage exclusion (35 mm clip)/Debridement of significant calcifications around the ostium of "~"the left and right coronary, by Dr. Gunter 8/26/24, pod#3"~"-Multivessel coronary artery disease status post multiple PCI and stenting with in-stent restenosis for previous myocardial infarction"~"-Moderate aortic valve stenosis with severe mitral annular calcification and calcified root"~"-LVEF 60-65 preop, 70-75% postop per intraop GEORGETTE"~"-Hx MI/CAD S/P PCI with VANESA to LAD in June 2024"~"-[place]"~"-T2DM since age of 19, on insulin pump with hgb A1C of  7.2         "~"-Diabetic neuropathy       "~"-PAF S/P ablation, 2021"~"-PSVT S/P ablation, 2021"~"-Loop recorder on CXR"~"-HTN"~"-HLD"~"-Class 2 obesity (BMI 37.6)"~"-TONO (not on CPAP/BIPAP)"~"-Preop E.Coli UTI (treated with Augmentin)"~"-S/P b/l cataracts"~"-S/p Repair of retinal detachment, 2021"~"-S/P b/l carpal tunnel "~"-S/p Cholecystectomy "~"-S/P Appendectomy"~"-S/P Left TKA "~"-S/p Release of trigger finger "~"-S/P Gissell-en-Y gastric bypass surgery noted on CT"~"-Acute postop blood loss/Anemia (received 2u PRBC's intraop and 1u PRBC postop)"~"-Acute postop thrombocytopenia (transfused 1 {5pk} plts)"~"-Acute postop atelectasis"~"-Acute postop hypovolemia with subsequent hypervolemia"~"-GRAY"~"Discussed patient care with: Nursing and Care Team"~"Subjective"~"Procedure"~"-: "~"- Standard sternotomy with aortic and right atrial cannulation/CABG x 3 (In situ LIMA to distal LAD, Ao to RSVG to proximal diagonal, Ao to RSVG to distal RCA)/Endoscopic harvest of bilateral lower extremities ultimately using the left lower "~"extremity vein/Surgical aortic valve replacement (21 mm bioprosthesis)/Modified posterior wall isolation, left atrial maze using RF ablation/Left atrial appendage exclusion (35 mm clip)/Debridement of significant calcifications around the ostium of "~"the left and right coronary, by Dr. Gunter 8/26/24"~"-"~"-: "~"Date of Service:  August 29, 2024"~"Objective Data"~"-"~"-: "~"Lab Results"~"08/29/24 04:57 "~"08/29/24 04:57 "~"PT	 18.5 Sec (11.4-14.6)  H 	08/26/24  19:01    "~"INR	 1.56  	08/26/24  19:01    "~"APTT	 37.2 Sec (23.4-35.0)  H 	08/26/24  19:01    "~"Vital Signs"~"-: "~"Vital Signs"~"Temp	Pulse	Resp	BP	Pulse Ox"~" 98 F 	 74 	 18 	 122/57 	 93 "~" 08/29/24 04:54	 08/29/24 05:00	 08/29/24 04:54	 08/29/24 04:54	 08/29/24 05:00"~"CT Intake/Output/Weight"~"	08/28/24	08/28/24	08/29/24"~"	06:59	18:59	06:59"~"Intake Total	229.9 / 868.7	699.1 / 1061.1	362 / 1061.1"~"Output Total	890 / 1245	1345 / 3505	2160 / 3505"~"Balance	-660.1 / -376.3	-645.9 / -2443.9	-1798 / -2443.9"~"SaO2: 93"~"Physical Exam"~"-"~"-: "~"General: Awake and AOx3"~"Cardiovascular: Regular rate & rhythm, No Murmurs and No Rub"~"Respiratory: Decreased Breath Sounds"~"Sternum: Stable"~"Incision: Clean, Dry and Dressing Intact"~"Extremities: Edema +1 (2+ DPs b/l)"~"-: "~"Abdomen: soft, nontender, nondistended, + bowel sounds"~"Data Reviewed"~"-"~"Lab Results: Results Reviewed"~"Medications: Active Meds Reviewed"~"Chest X-Ray: Report Reviewed and Image Reviewed"~"ECG: Report Reviewed and Image Reviewed"

## 2024-08-29 NOTE — PTCARENOTE
VS obtained, assessment stable. Patient assisted back oob after procedures. Worked w/CR, ambulated cardoso. Lunch ordered.

## 2024-08-29 NOTE — W.PN.UPDATE
"Update Note"~"Progress Note Update"~"-: "~"NO pacing required. Site cleansed with CHG. Suture removed and bipolar v-lead removed without difficulty. Bedrest x 1 hour and VS q15min x 4"

## 2024-08-29 NOTE — PTCARENOTE
"Patient received from night shift RN. EILEEN in chair, no reports of pain, mediastinal CT x2 intact draining serosanguineous fluid. Patient AOx4 with a flat affect. NSR on telemetry, HR in 70's, palpable pulses. Patient on 2LNC, satting 93-94% with an "~"occasional productive cough. Anand catheter in place, draining clear, yellow, urine. RIJ Cordis in place with KVO, patient assist x1-2 for mobility. Ate her breakfast in full, tolerated well. Patient's sister at bedside, updated her and the patient "~"about POC for the day. No questions indicated. Assessment of needs ongoing. "

## 2024-08-29 NOTE — PTCARENOTE
"received pt from night shift nurse. pt OOB in chair during assessment. Pt is AAOx3, NSR per tele,palpable peripheral pulses, +1 edema LLE, pox 94% on RA, lungs diminished at bases, bowel sounds hypoactive, abd round, obese. pt voiding dark moe "~"urine, post op surgical sites stable, bed locked, call bell within reach "

## 2024-08-29 NOTE — PTCARENOTE
"received pt from day shift nurse. pt OOB in chair during assessment. Pt is AAOx3, NSR per tele,palpable peripheral pulses, +1 edema LLE, pox 94% on RA, lungs diminished at bases, bowel sounds hypoactive, abd round, obese. pt voiding dark moe "~"urine, post op surgical sites stable, bed locked, call bell within reach "

## 2024-08-30 VITALS — SYSTOLIC BLOOD PRESSURE: 128 MMHG | DIASTOLIC BLOOD PRESSURE: 63 MMHG

## 2024-08-30 VITALS — RESPIRATION RATE: 20 BRPM

## 2024-08-30 VITALS — DIASTOLIC BLOOD PRESSURE: 63 MMHG | SYSTOLIC BLOOD PRESSURE: 126 MMHG | OXYGEN SATURATION: 94 % | HEART RATE: 67 BPM

## 2024-08-30 VITALS — SYSTOLIC BLOOD PRESSURE: 108 MMHG | DIASTOLIC BLOOD PRESSURE: 40 MMHG | RESPIRATION RATE: 20 BRPM

## 2024-08-30 VITALS — SYSTOLIC BLOOD PRESSURE: 128 MMHG | RESPIRATION RATE: 16 BRPM | DIASTOLIC BLOOD PRESSURE: 55 MMHG

## 2024-08-30 VITALS — DIASTOLIC BLOOD PRESSURE: 59 MMHG | SYSTOLIC BLOOD PRESSURE: 112 MMHG

## 2024-08-30 VITALS — SYSTOLIC BLOOD PRESSURE: 110 MMHG | DIASTOLIC BLOOD PRESSURE: 51 MMHG

## 2024-08-30 VITALS — SYSTOLIC BLOOD PRESSURE: 121 MMHG | DIASTOLIC BLOOD PRESSURE: 60 MMHG | RESPIRATION RATE: 16 BRPM

## 2024-08-30 VITALS — SYSTOLIC BLOOD PRESSURE: 126 MMHG | DIASTOLIC BLOOD PRESSURE: 60 MMHG

## 2024-08-30 VITALS — SYSTOLIC BLOOD PRESSURE: 110 MMHG | RESPIRATION RATE: 18 BRPM | DIASTOLIC BLOOD PRESSURE: 53 MMHG

## 2024-08-30 VITALS — RESPIRATION RATE: 14 BRPM

## 2024-08-30 LAB
BUN SERPL-MCNC: 28 MG/DL (ref 7–17)
CALCIUM SERPL-MCNC: 8.8 MG/DL (ref 8.4–10.2)
CHLORIDE SERPL-SCNC: 102 MMOL/L (ref 98–107)
CO2 SERPL-SCNC: 32 MMOL/L (ref 22–30)
EGFR: > 60
ERYTHROCYTE [DISTWIDTH] IN BLOOD BY AUTOMATED COUNT: 14.4 % (ref 11.5–14.5)
ESTIMATED CREATININE CLEARANCE: 59 ML/MIN
GLUCOSE - POINT OF CARE: 125 MG/DL (ref 70–99)
GLUCOSE - POINT OF CARE: 127 MG/DL (ref 70–99)
GLUCOSE - POINT OF CARE: 137 MG/DL (ref 70–99)
GLUCOSE SERPL-MCNC: 101 MG/DL (ref 70–99)
HCT VFR BLD AUTO: 26.3 % (ref 37–47)
HGB BLD-MCNC: 9.1 G/DL (ref 12–16)
MAGNESIUM SERPL-MCNC: 2.6 MG/DL (ref 1.6–2.3)
MCHC RBC AUTO-ENTMCNC: 34.6 G/DL (ref 33–37)
MCV RBC AUTO: 88 FL (ref 81–99)
PLATELET # BLD AUTO: 112 10^3/UL (ref 130–400)
POTASSIUM SERPL-SCNC: 3.6 MMOL/L (ref 3.5–5.1)
SODIUM SERPL-SCNC: 141 MMOL/L (ref 135–145)

## 2024-08-30 RX ADMIN — Medication 500 MG: at 07:58

## 2024-08-30 RX ADMIN — POTASSIUM CHLORIDE 40 MEQ: 1500 TABLET, EXTENDED RELEASE ORAL at 05:38

## 2024-08-30 RX ADMIN — ACETAMINOPHEN 1000 MG: 500 TABLET ORAL at 05:36

## 2024-08-30 RX ADMIN — ACETAMINOPHEN 1000 MG: 500 TABLET ORAL at 16:58

## 2024-08-30 RX ADMIN — EZETIMIBE 10 MG: 10 TABLET ORAL at 18:12

## 2024-08-30 RX ADMIN — SENNOSIDES AND DOCUSATE SODIUM 1 TABLET: 8.6; 5 TABLET ORAL at 21:03

## 2024-08-30 RX ADMIN — ACETAZOLAMIDE 250 MG: 250 TABLET ORAL at 08:00

## 2024-08-30 RX ADMIN — BUMETANIDE 1 MG: 0.25 INJECTION, SOLUTION INTRAMUSCULAR; INTRAVENOUS at 09:56

## 2024-08-30 RX ADMIN — ACETAMINOPHEN 650 MG: 325 TABLET ORAL at 11:42

## 2024-08-30 RX ADMIN — SOTALOL HYDROCHLORIDE 40 MG: 80 TABLET ORAL at 21:03

## 2024-08-30 RX ADMIN — SOTALOL HYDROCHLORIDE 40 MG: 80 TABLET ORAL at 08:00

## 2024-08-30 RX ADMIN — LIDOCAINE: 4 PATCH TOPICAL at 08:02

## 2024-08-30 RX ADMIN — Medication 50 MCG: at 07:58

## 2024-08-30 RX ADMIN — FERROUS SULFATE TAB 325 MG (65 MG ELEMENTAL FE) 325 MG: 325 (65 FE) TAB at 07:58

## 2024-08-30 RX ADMIN — LIDOCAINE 1 PATCH: 4 PATCH TOPICAL at 11:48

## 2024-08-30 RX ADMIN — ATORVASTATIN CALCIUM 40 MG: 40 TABLET, FILM COATED ORAL at 18:12

## 2024-08-30 RX ADMIN — ASPIRIN 81 MG: 81 TABLET, CHEWABLE ORAL at 07:58

## 2024-08-30 RX ADMIN — MUPIROCIN 1 APPLIC: 20 OINTMENT TOPICAL at 08:03

## 2024-08-30 RX ADMIN — BUMETANIDE 1 MG: 0.25 INJECTION, SOLUTION INTRAMUSCULAR; INTRAVENOUS at 16:58

## 2024-08-30 RX ADMIN — SENNOSIDES AND DOCUSATE SODIUM 1 TABLET: 8.6; 5 TABLET ORAL at 07:58

## 2024-08-30 RX ADMIN — SODIUM CHLORIDE: 900 INJECTION, SOLUTION INTRAVENOUS at 18:13

## 2024-08-30 RX ADMIN — CLOPIDOGREL BISULFATE 75 MG: 75 TABLET ORAL at 07:58

## 2024-08-30 RX ADMIN — Medication 500 MG: at 21:03

## 2024-08-30 RX ADMIN — PANTOPRAZOLE SODIUM 40 MG: 40 TABLET, DELAYED RELEASE ORAL at 07:58

## 2024-08-30 NOTE — W.PN.CT
"Today's Communication / Plan"~"-"~"-: "~"-pod #4"~"-no issues overnight"~"-diuresed with Diamox and iv Bumex- UO 1250/1920 in 12/24 hrs - continue"~"-Cr improving - 1.0 today (1.1 on 8/29, 1.2 on 8/28, peak 1.3 on 8/27, 0.7 preop). Bicarb is elevated at 32"~"-hg stable - 9.1 (8.6 on 8/29)"~"-platelets improving- 112K"~"-follow CXR"~"-current meds (ASA, Plavix, Lipitor, Zetia, Lopressor 12.5 bid, Sotalol 40 bid, Protonix). "~"-encourage IS, OOB"~"Assessment / Plan"~"-"~"-: "~"Assessment:"~"- Standard sternotomy with aortic and right atrial cannulation/CABG x 3 (In situ LIMA to distal LAD, Ao to RSVG to proximal diagonal, Ao to RSVG to distal RCA)/Endoscopic harvest of bilateral lower extremities ultimately using the left lower "~"extremity vein/Surgical aortic valve replacement (21 mm bioprosthesis)/Modified posterior wall isolation, left atrial maze using RF ablation/Left atrial appendage exclusion (35 mm clip)/Debridement of significant calcifications around the ostium of "~"the left and right coronary, by Dr. Gunter 8/26/24, pod#4"~"-Multivessel coronary artery disease status post multiple PCI and stenting with in-stent restenosis for previous myocardial infarction"~"-Moderate aortic valve stenosis with severe mitral annular calcification and calcified root"~"-LVEF 60-65 preop, 70-75% postop per intraop GEORGETTE"~"-Hx MI/CAD S/P PCI with VANESA to LAD in June 2024"~"-[place]"~"-T2DM since age of 19, on insulin pump with hgb A1C of  7.2         "~"-Diabetic neuropathy       "~"-PAF S/P ablation, 2021"~"-PSVT S/P ablation, 2021"~"-Loop recorder on CXR"~"-HTN"~"-HLD"~"-Class 2 obesity (BMI 37.6)"~"-TONO (not on CPAP/BIPAP)"~"-Preop E.Coli UTI (treated with Augmentin)"~"-S/P b/l cataracts"~"-S/p Repair of retinal detachment, 2021"~"-S/P b/l carpal tunnel "~"-S/p Cholecystectomy "~"-S/P Appendectomy"~"-S/P Left TKA "~"-S/p Release of trigger finger "~"-S/P Gissell-en-Y gastric bypass surgery noted on CT"~"-Acute postop blood loss/Anemia (received 2u PRBC's intraop and 1u PRBC postop)"~"-Acute postop thrombocytopenia (transfused 1 {5pk} plts)"~"-Acute postop atelectasis"~"-Acute postop hypovolemia with subsequent hypervolemia"~"-GRAY"~"Discussed patient care with: Nursing and Care Team"~"Subjective"~"Procedure"~"-: "~"- Standard sternotomy with aortic and right atrial cannulation/CABG x 3 (In situ LIMA to distal LAD, Ao to RSVG to proximal diagonal, Ao to RSVG to distal RCA)/Endoscopic harvest of bilateral lower extremities ultimately using the left lower "~"extremity vein/Surgical aortic valve replacement (21 mm bioprosthesis)/Modified posterior wall isolation, left atrial maze using RF ablation/Left atrial appendage exclusion (35 mm clip)/Debridement of significant calcifications around the ostium of "~"the left and right coronary, by Dr. Gunter 8/26/24"~"-"~"-: "~"Date of Service:  August 30, 2024"~"Objective Data"~"-"~"-: "~"PT	 18.5 Sec (11.4-14.6)  H 	08/26/24  19:01    "~"INR	 1.56  	08/26/24  19:01    "~"APTT	 37.2 Sec (23.4-35.0)  H 	08/26/24  19:01    "~"Vital Signs"~"-: "~"Vital Signs"~"Temp	Pulse	Resp	BP	Pulse Ox"~" 97.7 F 	 61 	 14 	 112/59 	 98 "~" 08/30/24 00:30	 08/30/24 02:00	 08/30/24 00:30	 08/30/24 00:22	 08/30/24 00:30"~"CT Intake/Output/Weight"~"	08/29/24	08/29/24	08/30/24"~"	06:59	18:59	06:59"~"Intake Total	422 / 1121.1	300 / 300	"~"Output Total	2210 / 3555	675 / 1325	650 / 1325"~"Balance	-1788 / -2433.9	-375 / -1025	-650 / -1025"~"SaO2: 98"~"Physical Exam"~"-"~"-: "~"General: Awake and AOx3"~"Cardiovascular: Regular rate & rhythm, No Murmurs and No Rub"~"Respiratory: Decreased Breath Sounds"~"Sternum: Stable"~"Incision: Clean, Dry and Dressing Intact"~"Abdomen: soft, nontender, nondistended, + bowel sounds"~"Extremities: Edema +1 (2+ DPs b/l)"~"-: "~"Data Reviewed"~"-"~"Lab Results: Results Reviewed"~"Medications: Active Meds Reviewed"~"Chest X-Ray: Report Reviewed and Image Reviewed"~"ECG: Report Reviewed and Image Reviewed"

## 2024-08-30 NOTE — PTCARENOTE
Received patient for 7a-7p shift. Pt AAOx3, without complaints. VSS, NSR on cardiac monitor. CTPA and Dr. Gunter in to see patient. Medications administered as ordered. Pt denies pain at this time. Will continue to monitor.

## 2024-08-30 NOTE — W.PN.CD
"Today's Communication / Plan"~"-"~"-: "~"Bumetanide 1 mg IV BID."~"Start dapagliflozin 10 mg daily.  Case management consult."~"D/C amiodarone."~"Restart metoprolol."~"Probably ok to restart anticoagulation given improvement in H/H (rivaroxaban 15 mg daily)."~"Impression / Plan"~"-"~"-: "~"Impression/Plan:  65 y/o female with IDDM1, PAF s/p prior PVI, HLD, untreated TONO, HFpEF, moderate AS and CAD s/p recent PCI (June 2024) admitted with unstable angina, now s/p CABG/AVR/MAZE/LAAE."~"#CAD/Unstable Angina"~"	-Acute on chronic."~"	-Prior PCI to LAD (as recently as June)"~"	-S/P 3V CABG (LIMA to LAD, SVG to D1, SVG to dRCA) with Dr. Gunter, 8/26/2024."~"	-Routine post operative management."~"	-Encourage incentive spirometry, ambulation."~"	-Continue atorvastatin, metoprolol and amiodarone."~"	-Chest tube/pain management per CT surgery."~"	-Antithrombotic therapy with clopidogrel and rivaroxaban 15 mg daily."~"#Moderate AS"~"	-Chronic."~"	-S/P #21 Ordriguez Inspiris Resilia AVR (SN [id number]) with Dr. Gunter, 8/26/2024."~"	-Post operative care as above."~"#PAF"~"	-Currently in NSR."~"	-Rhythm control via prior remote PVI, now S/P MAZE with Dr. Gunter, 8/26/2024."~"	-Rate control with sotalol, amiodarone and metoprolol."~"	-CHADS2-Vasc = 6 (CHF, HTN, Age x1, DM, Vascular Disease, Female)."~"	-Therapeutic anticoagulation at the discretion of CT surgery.  Rivaroxaban 15 mg daily."~"	-Discontinue amiodarone in light of reinitiation of sotalol (both are class III antiarrhythmics)."~"#HFpEF"~"	-Acute on chronic."~"	-Start bumetanide 1 mg IV BID."~"	-Start dapagliflozin 10 mg daily."~"	-Case management consult."~"#Anemia"~"	-Acute blood loss anemia secondary to surgery."~"	-S/P 2 units PRBC's and 1 unit of platelets in surgery."~"	-S/P 1 unit PRBC's on 8/27/2024 @ 01:05."~"	-S/P 1 additional unit on 8/28/2024."~"	-Hbg improved to 9.1."~"#Type I DM"~"	-Chronic."~"	-HbA1c = 7.0%."~"	-Continue insulin pump."~"	-Diabetic NP consulted."~"#Dyslipidemia"~"	-Chronic, stable."~"	-Resume atorvastatin and ezetimibe postop."~"#TONO"~"	-Chronic, untreated."~"	-Has not been on CPAP after hers was recalled."~"	-Outpatient reassessment."~"Subjective/Interval History:"~"Weight down 0.3 kg."~"Remains 91% on 2LNC."~"Hbg improved to 9.1."~"DATA:"~"Intraoperative GEORGETTE, 8/26/2024:"~"CONCLUSIONS"~" Moderate aortic stenosis.  Mild aortic regurgitation.  Valve annulus 21 mm."~" Normal left ventricular size and systolic function, mild LVH, stage I diastolic "~" function."~" No thrombus seen at left atrial appendage."~" The aorta has atheroma less than 5 mm and moderate calcifications."~" Severe MAC, trace MR."~" Moderate left atrial enlargement."~"POST OPERATIVE FINDINGS"~" Status post aortic valve replacement with 21 mm bioprosthetic aortic valve, the "~" valve is well-seated, no perivalvular leak, leaflets are functioning well.  No "~" intra valvular regurgitation.  Gradient could not be obtained because no "~" transgastric views were possible."~" "~" Status post CABG x 3, the left ventricle is vigorously emily, EF 70 to "~" 75%.  No regional wall motion abnormalities seen."~" "~" Status post left atrial appendage exclusion, no flow seen through the left "~" atrial appendage remnant."~" "~" Trace MR.  Trace TR."~" "~" Normal right ventricular function."~"Physical Exam"~"Vital Signs/Labs"~"-: "~"Vital Signs"~"Temp	Pulse	Resp	BP	Pulse Ox"~" 37.2 C 	 65 	 18 	 110/53 	 94 "~" 08/30/24 07:43	 08/30/24 08:00	 08/30/24 07:43	 08/30/24 08:00	 08/30/24 07:43"~"	08/28/24	08/29/24	08/30/24"~"	11:59	11:59	11:59"~"Actual Weight	95.9 kg	94.6 kg	94.3 kg"~"08/30/24 03:46 "~"08/30/24 03:46 "~"PT	 18.5 Sec (11.4-14.6)  H 	08/26/24  19:01    "~"INR	 1.56  	08/26/24  19:01    "~"APTT	 37.2 Sec (23.4-35.0)  H 	08/26/24  19:01    "~"Magnesium	 2.6 mg/dl (1.6-2.3)  H 	08/30/24  03:46    "~"Triglycerides	 58 mg/dl ()  	08/22/24  05:29    "~"LDL Cholesterol, Calc	 35 mg/dl 	08/22/24  05:29    "~"VLDL Cholesterol, Calc	 11 mg/dl (0-30)  	08/22/24  05:29    "~"HDL Cholesterol	 38 mg/dl 	08/22/24  05:29    "~"Physical Exam"~"Constitutional: No acute distress and Comfortable"~"EENT: Anicteric and Moist mucous membranes"~"Cardiovascular: Rhythm & rate is regular, JVD pressure is normal, Pedal edema present, S1S2 is normal and Murmur/rub/gallop absent"~"Respiratory: Respiratory effort normal, Lungs clear to auscul., Wheeze Absent, Crackles Absent and Rhonchi Absent"~"GI: Soft, Distention absent, Flat, Non tender and Normal bowel sounds"~"Neuro/Psych: AO x 3"~"Data Reviewed"~"-"~"-: "~"Date of Service:  August 30, 2024"~"Medical Decision Making: Reviewed Test Results, Independent Historian Assessment, Test Interpretation and Review of Case with other Provider"~"EKG: Tracing Personally Visualized and interpreted and Report Reviewed by me"~"Echo: Tracing Personally Visualized and interpreted and Report Reviewed by me"~"X-Ray/CT/US/MRI/NUC/PET: Image Personally Visualized and interpreted and Report Reviewed by me"~"Medical Tests (PFT, Pathology etc): Report Reviewed by me"~"Labs: Labs Reviewed by me"

## 2024-08-30 NOTE — W.PN.UPDATE
"Update Note"~"Progress Note Update"~"-: "~"Patient is hesitant to start farxiga today without discussion with her primary cardiologist & endocrinologist. I will stop for now, but patient amenable to receiving pricing from . "

## 2024-08-30 NOTE — PTCARENOTE
Patient refusing Farxiga. Purpose and importance reinforced to patient. CTPA Christa aware and to talk to patient. Will continue to monitor.

## 2024-08-30 NOTE — PTCARENOTE
"Report received from ZAHIRA Bowen. Pt awake, alert, sitting in recliner chair. Oriented x 4. Speech clear. Pt on room air. Sat 94%. BBS present. Decreased to B bases. Pt in SR. Audible heart tones. BP normotensive. See flowsheet. Palpable, +2 B radial "~"and DP pulses, bilaterally. PT pulses +1, bilaterally. For wound assessments, see flowsheets. Surgical bra intact. Belly soft, nontender. Normoactive bowel sounds x 4. No BM yet. Pt reports passing flatus. Voids clear, yellow urine in toilet. CHG "~"cloth bath given. Clean linens applied to bed. Pt assisted to bed with 2 RNs. Pt changed glucose sensor. Ongoing plan of care."

## 2024-08-30 NOTE — PN.DE.MGMTRT
"Insulin Management"~"- -"~"-: "~"8/30/2024: Diabetes Management F/U:"~"Patient transferred from [facility] with hx of MI/CAD S/P PCI stent to LAD in June 2024. Pt was in Cardiac rehab @ [facility] on Friday 8/16/24 when she developed 10/10 substernal chest pain with radiation to the neck/jaw--&gt; Cardiac cath--&gt; extensive LAD "~"disease--&gt; refereed to  for CABG."~"PMH: HTN, CAD/MI s/p PCI with VANESA to LAD, PAF S/P ablation, 2021, PSVT S/P ablation 2021, HLD, IDDMx 45 years and using the insulin pump x35 yrs, Diabetic neuropathy and Class 2 obesity (BMI 37.6). Routinely sees Endo @ [facility] Dr. Johnson"~"Currently using Medtronic 780 G Insulin pump with Guardian Sensor and NovoLog insulin and Vu advanced infusion set."~"Patient awake A/O sitting up in chair, pleasant, offers no c/o, states she is feeling better, able to discuss diabetes mgt plan."~"POD #4 s/p CABG x 3 doing well, Cr 1.0, eGFR&gt; 60"~"8/28 Transitioned from glycemic protocol to insulin pump.  "~"Glucose stable and in range of premeal 78 to 145, fasting 101 this AM"~"Will make no adjustments to current pump settings."~" "~"Diabetes History"~"- -"~"Type of Diabetes: 2 requiring insulin"~"-: "~"Pre-Admission Diabetes Regimen"~" 	08/30/24"~" 	03:46"~"Creatinine	 1.0"~"Lab Results"~"Hemoglobin A1c	 7.0 % (4.0-5.6)  H 	08/21/24  05:47    "~"Insulin Pump Settings"~"IP Diabetes Regimen"~" 	08/29/24	08/29/24	08/29/24"~" 	07:52	11:47	17:43"~"Glucose			"~"POC Glucose	 78	 145 H	 110 H"~" 	08/29/24	08/30/24	08/30/24"~" 	21:40	03:46	06:16"~"Glucose		 101 H	"~"POC Glucose	 158 H		 125 H"~"Meal type:                    	Dinner                                          	"~"Meal type:                    	Breakfast                                       	"~"Amount consumed:              	100%                                            	"~"Amount consumed:              	90%                                             	"~"Patient Education"

## 2024-08-31 VITALS — RESPIRATION RATE: 16 BRPM | DIASTOLIC BLOOD PRESSURE: 62 MMHG | SYSTOLIC BLOOD PRESSURE: 127 MMHG

## 2024-08-31 VITALS — SYSTOLIC BLOOD PRESSURE: 88 MMHG | DIASTOLIC BLOOD PRESSURE: 57 MMHG

## 2024-08-31 VITALS — DIASTOLIC BLOOD PRESSURE: 56 MMHG | SYSTOLIC BLOOD PRESSURE: 132 MMHG

## 2024-08-31 VITALS — SYSTOLIC BLOOD PRESSURE: 137 MMHG | DIASTOLIC BLOOD PRESSURE: 51 MMHG

## 2024-08-31 VITALS — DIASTOLIC BLOOD PRESSURE: 60 MMHG | SYSTOLIC BLOOD PRESSURE: 121 MMHG

## 2024-08-31 VITALS — DIASTOLIC BLOOD PRESSURE: 52 MMHG | SYSTOLIC BLOOD PRESSURE: 132 MMHG | OXYGEN SATURATION: 100 % | HEART RATE: 65 BPM

## 2024-08-31 VITALS — DIASTOLIC BLOOD PRESSURE: 52 MMHG | SYSTOLIC BLOOD PRESSURE: 132 MMHG

## 2024-08-31 VITALS — RESPIRATION RATE: 18 BRPM | SYSTOLIC BLOOD PRESSURE: 120 MMHG | DIASTOLIC BLOOD PRESSURE: 54 MMHG

## 2024-08-31 VITALS — SYSTOLIC BLOOD PRESSURE: 121 MMHG | RESPIRATION RATE: 18 BRPM | DIASTOLIC BLOOD PRESSURE: 60 MMHG

## 2024-08-31 VITALS — SYSTOLIC BLOOD PRESSURE: 123 MMHG | DIASTOLIC BLOOD PRESSURE: 70 MMHG

## 2024-08-31 VITALS — SYSTOLIC BLOOD PRESSURE: 116 MMHG | DIASTOLIC BLOOD PRESSURE: 64 MMHG

## 2024-08-31 VITALS — SYSTOLIC BLOOD PRESSURE: 121 MMHG | RESPIRATION RATE: 16 BRPM | DIASTOLIC BLOOD PRESSURE: 49 MMHG

## 2024-08-31 VITALS — RESPIRATION RATE: 18 BRPM | SYSTOLIC BLOOD PRESSURE: 137 MMHG | DIASTOLIC BLOOD PRESSURE: 51 MMHG

## 2024-08-31 LAB
BUN SERPL-MCNC: 24 MG/DL (ref 7–17)
CALCIUM SERPL-MCNC: 8.7 MG/DL (ref 8.4–10.2)
CHLORIDE SERPL-SCNC: 104 MMOL/L (ref 98–107)
CO2 SERPL-SCNC: 29 MMOL/L (ref 22–30)
EGFR: > 60
ERYTHROCYTE [DISTWIDTH] IN BLOOD BY AUTOMATED COUNT: 14.4 % (ref 11.5–14.5)
ESTIMATED CREATININE CLEARANCE: 65 ML/MIN
GLUCOSE - POINT OF CARE: 104 MG/DL (ref 70–99)
GLUCOSE - POINT OF CARE: 116 MG/DL (ref 70–99)
GLUCOSE - POINT OF CARE: 116 MG/DL (ref 70–99)
GLUCOSE - POINT OF CARE: 147 MG/DL (ref 70–99)
GLUCOSE - POINT OF CARE: 158 MG/DL (ref 70–99)
GLUCOSE SERPL-MCNC: 113 MG/DL (ref 70–99)
HCT VFR BLD AUTO: 27.3 % (ref 37–47)
HGB BLD-MCNC: 9.2 G/DL (ref 12–16)
MAGNESIUM SERPL-MCNC: 2.6 MG/DL (ref 1.6–2.3)
MCHC RBC AUTO-ENTMCNC: 33.7 G/DL (ref 33–37)
MCV RBC AUTO: 92.2 FL (ref 81–99)
PLATELET # BLD AUTO: 134 10^3/UL (ref 130–400)
POTASSIUM SERPL-SCNC: 4 MMOL/L (ref 3.5–5.1)
SODIUM SERPL-SCNC: 142 MMOL/L (ref 135–145)
SQUAMOUS URNS QL MICRO: (no result) /LPF
URINE RED BLOOD CELL: (no result) /HPF (ref 0–2)

## 2024-08-31 RX ADMIN — SENNOSIDES AND DOCUSATE SODIUM 1 TABLET: 8.6; 5 TABLET ORAL at 21:16

## 2024-08-31 RX ADMIN — SENNOSIDES AND DOCUSATE SODIUM 1 TABLET: 8.6; 5 TABLET ORAL at 09:23

## 2024-08-31 RX ADMIN — ACETAMINOPHEN 1000 MG: 500 TABLET ORAL at 00:03

## 2024-08-31 RX ADMIN — SOTALOL HYDROCHLORIDE 40 MG: 80 TABLET ORAL at 09:23

## 2024-08-31 RX ADMIN — FERROUS SULFATE TAB 325 MG (65 MG ELEMENTAL FE) 325 MG: 325 (65 FE) TAB at 09:22

## 2024-08-31 RX ADMIN — EZETIMIBE 10 MG: 10 TABLET ORAL at 17:30

## 2024-08-31 RX ADMIN — Medication: at 21:17

## 2024-08-31 RX ADMIN — BUMETANIDE 1 MG: 0.25 INJECTION, SOLUTION INTRAMUSCULAR; INTRAVENOUS at 09:22

## 2024-08-31 RX ADMIN — LIDOCAINE 1 PATCH: 4 PATCH TOPICAL at 09:22

## 2024-08-31 RX ADMIN — Medication 500 MG: at 09:23

## 2024-08-31 RX ADMIN — ACETAMINOPHEN 1000 MG: 500 TABLET ORAL at 07:16

## 2024-08-31 RX ADMIN — MAGNESIUM HYDROXIDE 30 ML: 1200 LIQUID ORAL at 17:31

## 2024-08-31 RX ADMIN — ATORVASTATIN CALCIUM 40 MG: 40 TABLET, FILM COATED ORAL at 17:30

## 2024-08-31 RX ADMIN — ACETAMINOPHEN 1000 MG: 500 TABLET ORAL at 15:47

## 2024-08-31 RX ADMIN — GABAPENTIN 900 MG: 300 CAPSULE ORAL at 00:04

## 2024-08-31 RX ADMIN — PANTOPRAZOLE SODIUM 40 MG: 40 TABLET, DELAYED RELEASE ORAL at 09:23

## 2024-08-31 RX ADMIN — RIVAROXABAN 15 MG: 15 TABLET, FILM COATED ORAL at 17:30

## 2024-08-31 RX ADMIN — CLOPIDOGREL BISULFATE 75 MG: 75 TABLET ORAL at 09:22

## 2024-08-31 RX ADMIN — SOTALOL HYDROCHLORIDE 40 MG: 80 TABLET ORAL at 21:16

## 2024-08-31 RX ADMIN — Medication 500 MG: at 09:25

## 2024-08-31 RX ADMIN — BUMETANIDE 1 MG: 0.25 INJECTION, SOLUTION INTRAMUSCULAR; INTRAVENOUS at 15:47

## 2024-08-31 RX ADMIN — Medication 50 MCG: at 09:23

## 2024-08-31 RX ADMIN — SODIUM CHLORIDE: 900 INJECTION, SOLUTION INTRAVENOUS at 17:31

## 2024-08-31 RX ADMIN — GABAPENTIN 900 MG: 300 CAPSULE ORAL at 21:15

## 2024-08-31 RX ADMIN — ACETAMINOPHEN 1000 MG: 500 TABLET ORAL at 21:16

## 2024-08-31 NOTE — PTCARENOTE
Bedside walking rounds report received. Patient seen on rounds oob in chair on room air. NSR. Ambulating room and hallway. Vitals stable.

## 2024-08-31 NOTE — W.PN.CT
"Today's Communication / Plan"~"-"~"-: "~"Plan:"~"-No major issues overnight. Hemodynamically and neurologically intact"~"-Will resume Xarelto today, f/u CBC (pending, difficult stick for blood)"~"-Refusing Farxiga until she speaks to primary Cardiologist"~"-Using own insulin pump. Diabetes management following"~"-Diuresing with Bumex 1 mg IV BID, 24hrs u/o 2450 mL"~"-Cont. current meds (ASA, Plavix, Lipitor, Zetia, Lopressor 12.5 bid, Sotalol 40 bid, Protonix; resume Xarelto)"~"-F/u 2-view cxr"~"-Encourage use of IS"~"-OOB into chair/Ambulate"~"-Home likely tomorrow"~"Assessment / Plan"~"-"~"-: "~"Assessment:"~"- Standard sternotomy with aortic and right atrial cannulation/CABG x 3 (In situ LIMA to distal LAD, Ao to RSVG to proximal diagonal, Ao to RSVG to distal RCA)/Endoscopic harvest of bilateral lower extremities ultimately using the left lower "~"extremity vein/Surgical aortic valve replacement (21 mm bioprosthesis)/Modified posterior wall isolation, left atrial maze using RF ablation/Left atrial appendage exclusion (35 mm clip)/Debridement of significant calcifications around the ostium of "~"the left and right coronary, by Dr. Gunter 8/26/24, pod#5"~"-Multivessel coronary artery disease status post multiple PCI and stenting with in-stent restenosis for previous myocardial infarction"~"-Moderate aortic valve stenosis with severe mitral annular calcification and calcified root"~"-LVEF 60-65 preop, 70-75% postop per intraop GEORGETTE"~"-Hx MI/CAD S/P PCI with VANESA to LAD in June 2024"~"-[place]"~"-T2DM since age of 19, on insulin pump with hgb A1C of  7.2         "~"-Diabetic neuropathy       "~"-PAF S/P ablation, 2021"~"-PSVT S/P ablation, 2021"~"-Loop recorder on CXR"~"-HTN"~"-HLD"~"-Class 2 obesity (BMI 37.6)"~"-TONO (not on CPAP/BIPAP)"~"-Preop E.Coli UTI (treated with Augmentin)"~"-S/P b/l cataracts"~"-S/p Repair of retinal detachment, 2021"~"-S/P b/l carpal tunnel "~"-S/p Cholecystectomy "~"-S/P Appendectomy"~"-S/P Left TKA "~"-S/p Release of trigger finger "~"-S/P Gissell-en-Y gastric bypass surgery noted on CT"~"-Acute postop blood loss/Anemia (received 2u PRBC's intraop and 1u PRBC postop)"~"-Acute postop thrombocytopenia (transfused 1 {5pk} plts)"~"-Acute postop atelectasis"~"-Acute postop hypovolemia with subsequent hypervolemia"~"-GRAY"~"-Acute postop small right apical pneumothorax"~"Discussed patient care with: Cardiology, Nursing, Respiratory Therapy, Pharmacy and Care Team"~"Subjective"~"Procedure"~"-: "~"- Standard sternotomy with aortic and right atrial cannulation/CABG x 3 (In situ LIMA to distal LAD, Ao to RSVG to proximal diagonal, Ao to RSVG to distal RCA)/Endoscopic harvest of bilateral lower extremities ultimately using the left lower "~"extremity vein/Surgical aortic valve replacement (21 mm bioprosthesis)/Modified posterior wall isolation, left atrial maze using RF ablation/Left atrial appendage exclusion (35 mm clip)/Debridement of significant calcifications around the ostium of "~"the left and right coronary, by Dr. Gunter 8/26/24"~"-"~"-: "~"Date of Service:  August 31, 2024"~"Pt c/o mild incisional pain, otherwise feels well"~"Objective Data"~"-"~"-: "~"PT	 18.5 Sec (11.4-14.6)  H 	08/26/24  19:01    "~"INR	 1.56  	08/26/24  19:01    "~"APTT	 37.2 Sec (23.4-35.0)  H 	08/26/24  19:01    "~"Vital Signs"~"-: "~"Vital Signs"~"Temp	Pulse	Resp	BP	Pulse Ox"~" 98 F 	 66 	 16 	 127/62 	 92 "~" 08/31/24 04:08	 08/31/24 04:08	 08/31/24 04:08	 08/31/24 04:08	 08/31/24 04:08"~"CT Intake/Output/Weight"~"	08/30/24	08/30/24	08/31/24"~"	06:59	18:59	06:59"~"Intake Total		480 / 480	"~"Output Total	1250 / 1925	1500 / 2450	950 / 2450"~"Balance	-1250 / -1625	-1020 / -1970	-950 / -1970"~"SaO2: 92 (RA)"~"Physical Exam"~"-"~"General: Awake, Oriented and AOx3"~"Cardiovascular: Regular rate & rhythm, No Murmurs, No Rub and No Gallop"~"Respiratory: Decreased Breath Sounds (at bases, otherwise clear)"~"Sternum: Stable"~"Incision: Clean, Dry, Intact and Dressing Intact"~"Extremities: Other (+trace edema)"~"Data Reviewed"~"-"~"Lab Results: Results Reviewed"~"Medications: Active Meds Reviewed"~"Chest X-Ray: Report Reviewed and Image Reviewed"~"ECG: Report Reviewed and Image Reviewed"

## 2024-08-31 NOTE — PTCARENOTE
"Evening glucose done 2 hours after pt changed sensor to L upper arm with 2 hours of allowing sensor to refill with blood. Accucheck with hospital machine is 116. Her glucometer read 115."~"She c/o 5/10 sternal pain.  Tylenol 1 GM given as scheduled along with scheduled gabapentin. VS done. See flowsheet. "

## 2024-08-31 NOTE — PTCARENOTE
"assumed pt from day shift nurse. AAOx3, NSR per tele, palpable pulses, generalized trace edema, pox 95% on RA, B/L lungs diminished at bases, hypoactive bs, +BM, will collect UA next void, post op surgical sites intact, PIV intact, call bell within "~"reach, plan of care discussed with pt questions encourage "

## 2024-08-31 NOTE — PTCARENOTE
VS done. Labs drawn and sent after multiple sticks. Dressing changed to old CT sites. New Surgibra placed onto pt. Pt helped up to chair.

## 2024-08-31 NOTE — W.PN.CD
"Today's Communication / Plan"~"-"~"-: "~"Hemodynamically stable.  Remains in sinus rhythm.  ECG stable.  Continue postoperative care as directed by CT surgery"~"Impression / Plan"~"-"~"-: "~"Impression/Plan:  63 y/o female with IDDM1, PAF s/p prior PVI, HLD, untreated TONO, HFpEF, moderate AS and CAD s/p recent PCI (June 2024) admitted with unstable angina, now s/p CABG/AVR/MAZE/LAAE."~"#CAD/Unstable Angina"~"	-Acute on chronic."~"	-Prior PCI to LAD (as recently as June)"~"	-S/P 3V CABG (LIMA to LAD, SVG to D1, SVG to dRCA) with Dr. Gunter, 8/26/2024."~"	-Routine post operative management."~"	-Antithrombotic therapy with clopidogrel and rivaroxaban 15 mg daily."~"#-S/P #21 Rodriguez Inspiris Resilia AVR (SN [id number]) with Dr. Gunter, 8/26/2024."~"	"~"#PAF"~"	-Currently in NSR."~"	-Rhythm control via prior remote PVI, now S/P MAZE with Dr. Gunter, 8/26/2024."~"	-Rate control   metoprolol."~"	-CHADS2-Vasc = 6 (CHF, HTN, Age x1, DM, Vascular Disease, Female)."~"	-Therapeutic anticoagulation at the discretion of CT surgery.  Rivaroxaban 15 mg daily."~"	-Discontinue amiodarone in light of reinitiation of sotalol (both are class III antiarrhythmics)."~"#HFpEF"~"	-Acute on chronic."~"	-Start bumetanide 1 mg IV BID."~"	-Patient reluctant to start until she follows up eith primary endocrinologist and cardiologistdapagliflozin 10 mg daily."~"	-Case management consult."~"#Anemia - monitor"~"	-Acute blood loss anemia secondary to surgery."~"	-S/P 2 units PRBC's and 1 unit of platelets in surgery."~"	-S/P 1 unit PRBC's on 8/27/2024 @ 01:05."~"	-S/P 1 additional unit on 8/28/2024."~" "~"#Type I DM"~"	-Chronic."~"	-HbA1c = 7.0%."~"	-Continue insulin pump."~"	-Diabetic NP consulted."~"#Dyslipidemia"~"	-Chronic, stable."~"	-Resume atorvastatin and ezetimibe postop."~"#TONO"~"	-Chronic, untreated."~"	-Has not been on CPAP after hers was recalled."~"	-Outpatient reassessment."~"Subjective/Interval History:"~"Weight down 0.3 kg."~"Remains 91% on 2LNC."~"Hbg improved to 9.1."~"DATA:"~"Intraoperative GEORGETTE, 8/26/2024:"~"CONCLUSIONS"~" Moderate aortic stenosis.  Mild aortic regurgitation.  Valve annulus 21 mm."~" Normal left ventricular size and systolic function, mild LVH, stage I diastolic "~" function."~" No thrombus seen at left atrial appendage."~" The aorta has atheroma less than 5 mm and moderate calcifications."~" Severe MAC, trace MR."~" Moderate left atrial enlargement."~"POST OPERATIVE FINDINGS"~" Status post aortic valve replacement with 21 mm bioprosthetic aortic valve, the "~" valve is well-seated, no perivalvular leak, leaflets are functioning well.  No "~" intra valvular regurgitation.  Gradient could not be obtained because no "~" transgastric views were possible."~" "~" Status post CABG x 3, the left ventricle is vigorously emily, EF 70 to "~" 75%.  No regional wall motion abnormalities seen."~" "~" Status post left atrial appendage exclusion, no flow seen through the left "~" atrial appendage remnant."~" "~" Trace MR.  Trace TR."~" "~" Normal right ventricular function."~"Physical Exam"~"Vital Signs/Labs"~"-: "~"Vital Signs"~"Temp	Pulse	Resp	BP	Pulse Ox"~" 98.1 F 	 66 	 18 	 121/60 	 95 "~" 08/31/24 15:45	 08/31/24 15:45	 08/31/24 15:45	 08/31/24 15:45	 08/31/24 15:45"~"	08/30/24	08/31/24	09/01/24"~"	06:59	06:59	06:59"~"Actual Weight	94.3 kg		94 kg"~"08/31/24 06:07 "~"08/31/24 06:07 "~"PT	 18.5 Sec (11.4-14.6)  H 	08/26/24  19:01    "~"INR	 1.56  	08/26/24  19:01    "~"APTT	 37.2 Sec (23.4-35.0)  H 	08/26/24  19:01    "~"Magnesium	 2.6 mg/dl (1.6-2.3)  H 	08/31/24  06:07    "~"Triglycerides	 58 mg/dl ()  	08/22/24  05:29    "~"LDL Cholesterol, Calc	 35 mg/dl 	08/22/24  05:29    "~"VLDL Cholesterol, Calc	 11 mg/dl (0-30)  	08/22/24  05:29    "~"HDL Cholesterol	 38 mg/dl 	08/22/24  05:29    "~"Physical Exam"~"Constitutional: No acute distress"~"Cardiovascular: Rhythm & rate is regular"~"Respiratory: Wheeze Absent and Rhonchi Absent"~"GI: Soft and Normal bowel sounds"~"Neuro/Psych: Alert"~"Data Reviewed"~"-"~"-: "~"Date of Service:  August 31, 2024"~"Medical Decision Making: Reviewed Test Results"~"X-Ray/CT/US/MRI/NUC/PET: Report Reviewed by me"~"Medical Tests (PFT, Pathology etc): Report Reviewed by me"~"Labs: Labs Reviewed by me"

## 2024-09-01 VITALS — RESPIRATION RATE: 14 BRPM

## 2024-09-01 VITALS — SYSTOLIC BLOOD PRESSURE: 126 MMHG | DIASTOLIC BLOOD PRESSURE: 65 MMHG | RESPIRATION RATE: 22 BRPM

## 2024-09-01 VITALS — SYSTOLIC BLOOD PRESSURE: 126 MMHG | DIASTOLIC BLOOD PRESSURE: 65 MMHG

## 2024-09-01 VITALS — DIASTOLIC BLOOD PRESSURE: 59 MMHG | SYSTOLIC BLOOD PRESSURE: 109 MMHG

## 2024-09-01 VITALS — RESPIRATION RATE: 16 BRPM

## 2024-09-01 VITALS — DIASTOLIC BLOOD PRESSURE: 59 MMHG | RESPIRATION RATE: 18 BRPM | SYSTOLIC BLOOD PRESSURE: 109 MMHG

## 2024-09-01 VITALS — SYSTOLIC BLOOD PRESSURE: 128 MMHG | DIASTOLIC BLOOD PRESSURE: 74 MMHG

## 2024-09-01 LAB
BUN SERPL-MCNC: 25 MG/DL (ref 7–17)
CALCIUM SERPL-MCNC: 8.8 MG/DL (ref 8.4–10.2)
CHLORIDE SERPL-SCNC: 105 MMOL/L (ref 98–107)
CO2 SERPL-SCNC: 32 MMOL/L (ref 22–30)
EGFR: > 60
ERYTHROCYTE [DISTWIDTH] IN BLOOD BY AUTOMATED COUNT: 16.3 % (ref 11.5–14.5)
ESTIMATED CREATININE CLEARANCE: 65 ML/MIN
GLUCOSE - POINT OF CARE: 108 MG/DL (ref 70–99)
GLUCOSE SERPL-MCNC: 119 MG/DL (ref 70–99)
HCT VFR BLD AUTO: 28.4 % (ref 37–47)
HGB BLD-MCNC: 9.7 G/DL (ref 12–16)
MAGNESIUM SERPL-MCNC: 3 MG/DL (ref 1.6–2.3)
MCHC RBC AUTO-ENTMCNC: 34.2 G/DL (ref 33–37)
MCV RBC AUTO: 89.6 FL (ref 81–99)
PLATELET # BLD AUTO: 166 10^3/UL (ref 130–400)
POTASSIUM SERPL-SCNC: 4 MMOL/L (ref 3.5–5.1)
SODIUM SERPL-SCNC: 143 MMOL/L (ref 135–145)

## 2024-09-01 RX ADMIN — SENNOSIDES AND DOCUSATE SODIUM: 8.6; 5 TABLET ORAL at 08:43

## 2024-09-01 RX ADMIN — FUROSEMIDE 40 MG: 10 INJECTION, SOLUTION INTRAMUSCULAR; INTRAVENOUS at 08:29

## 2024-09-01 RX ADMIN — CLOPIDOGREL BISULFATE 75 MG: 75 TABLET ORAL at 08:31

## 2024-09-01 RX ADMIN — Medication 50 MCG: at 08:30

## 2024-09-01 RX ADMIN — PANTOPRAZOLE SODIUM 40 MG: 40 TABLET, DELAYED RELEASE ORAL at 08:29

## 2024-09-01 RX ADMIN — AMOXICILLIN AND CLAVULANATE POTASSIUM 1 TABLET: 500; 125 TABLET, FILM COATED ORAL at 08:31

## 2024-09-01 RX ADMIN — ACETAMINOPHEN 1000 MG: 500 TABLET ORAL at 05:57

## 2024-09-01 RX ADMIN — FERROUS SULFATE TAB 325 MG (65 MG ELEMENTAL FE) 325 MG: 325 (65 FE) TAB at 08:30

## 2024-09-01 RX ADMIN — LIDOCAINE: 4 PATCH TOPICAL at 08:33

## 2024-09-01 RX ADMIN — SOTALOL HYDROCHLORIDE 40 MG: 80 TABLET ORAL at 08:30

## 2024-09-01 RX ADMIN — Medication 500 MG: at 08:31

## 2024-09-01 RX ADMIN — POTASSIUM CHLORIDE 20 MEQ: 1500 TABLET, EXTENDED RELEASE ORAL at 08:33

## 2024-09-01 NOTE — PTCARENOTE
Patient went up and down 12 steps: proper technique taught and demonstrated correctly by patient: see post activity vitals.

## 2024-09-01 NOTE — PTCARENOTE
"No acute changes. After doing 12 steps, it was noted that left upper leg svg site was oozing small amt of serosanguineous drainage on floor: ashley abd over same and Milly Anderson PA-c aware. No new orders at this time. Patient is clear for discharge. Shower "~"before home. "

## 2024-09-01 NOTE — PTCARENOTE
Bedside walking rounds report received. Patient seen on rounds oob in chair on room air. NSR. Ambulating room and hallway. Vitals stable. Probable home today. between 3rd and 4th toe

## 2024-09-01 NOTE — W.DCSUMMARY
"Discharge Summary"~"Discharge Data"~"Date of Admission: 08/20/24"~"Date of Discharge: 09/01/24"~"-"~"Pending Results: No"~"Hospital Course"~"-: "~"Patient is a 64-year-old female initially admitted to [facility] on 820 with unstable angina.  She had a history of a MI and PCI and drug-eluting stents to her LAD.  She also had a history of moderate aortic stenosis and ejection "~"fraction was noted to be 65%."~"Past medical history of type I diabetic on an insulin pump, diabetic nephropathy, paroxysmal atrial fibrillation, status post ablation, PSVT status post ablation 2021, hypertension, hyperlipidemia, obesity status post gastric bypass with a Gissell-en-Y "~"procedure.  She underwent cardiac catheterization which revealed two-vessel coronary artery disease and she was transferred to [facility] for surgical revascularization.  On admission she was found to have a urinary tract infection and was "~"treated with a course of antibiotics.  She also underwent a Plavix washout prior to surgery.  She was seen in consultation by Dr. Guanaco Gunter and she consented to surgery.  On 8/26/2024 she was brought to the operating room where she underwent "~"coronary artery bypass grafting with 3 distal anastomoses.  The left internal mammary artery was utilized to bypass the LAD.  2 separate saphenous vein grafts were placed to the diagonal and right coronary artery.  She also underwent aortic valve "~"replacement with a 21 mm Inspira's bioprosthetic valve.  She also underwent maze and a left atrial appendage clipping with a #35 AtriClip.  She tolerated the procedure well and postoperative echo looked good no PVL.  She was initially supported on "~"low-dose Levophed and was able to be extubated later that day."~"Postoperative day 1 her monitoring lines were discontinued.  She had been weaned off Levophed and did require a short course of IV fluids for low urine output.  She was started on beta-blockers and diuresed."~"Postoperative day #2 again she received 60 mg IV Lasix and was started on a Bumex drip for low urine output.  She received 1 unit of packed red blood cells for postoperative anemia.  Pleural chest tube was removed and she began to ambulate with "~"cardiac rehabilitation.  Her insulin drip had been discontinued and she started back on her own insulin pump."~"Postoperative day #3 she was given Diamox along with IV Bumex with good diuresis.  Her Anand was able to be removed and her temporary pacing wires discontinued.  Her mediastinal chest tube was removed.  She was resumed on her home dose of sotalol "~"per cardiology and her amiodarone was discontinued."~"Postoperative day 4 her weight was slowly trending down and she was continued on IV Bumex along with p.o. Diamox.  She had good urine output and she continued to slowly ambulate."~"Postoperative day #5 she was restarted on her Xarelto and her aspirin was stopped.  Plan to continue Xarelto and Plavix.  Her weight was slowly returning to baseline her O2 sat was 92% on room air, and chest x-ray revealed small bilateral effusions. "~" She was continued on Bumex 1 mg twice daily.  Blood pressure was stable.  She was ambulating increased distances without difficulty."~"Postoperative day #6 she was afebrile heart rate was 66 regular blood pressure 128/74 and her O2 sat on room air was 96%.  Her weight was down 4 pounds from yesterday and she was slowly returning to her baseline weight.  She was now ready for "~"discharge.  She did have a foul-smelling urine which was sent, culture pending at time of discharge.  She was afebrile and white blood cell count was 8.7.  She was given a prescription for Augmentin."~"At time of discharge she was afebrile heart rate was 66 and regular.  Blood pressure was 128/74 and room air O2 sat was 96%."~"Discharge labs are as follows white blood cell count 8.7, hemoglobin 9.7, hematocrit 28.4, platelets 166, BUN 25 and creatinine 0.9.  Blood sugar this morning was 119.  She was given a full set of discharge instructions and will be seen by her "~"transitional care nurses after her arrival at home.  She was given an appointment to follow-up with Dr. Gunter.  And she will have a follow-up appointment at [facility] with Dr. Yang."~"Discharge Plan"~"-"~"Patient Disposition: Home (Routine Discharge)"~"Discharge Diagnosis/Procedures: AVR/CABG "~"Condition: Fair"~"Diet: Low Cholesterol, Low Sodium and Diabetic, Carb Controlled"~"Activity: No strenuous activity"~"Driving Restrictions: Not until seen by your Dr"~"Bathing Restrictions: OK to Shower"~"Other Services: Cardiac Rehab"~"Specialty Instructions: Weigh Daily- Call MD for wt gain/loss 3 lbs overnight/5 lbs in 1 week"~"Activity Restrictions/Additional Instructions:"~"ACTIVITY:  "~"	-No strenuous activity: no heavy lifting, pushing, pulling anything over 15 pounds for one month"~"	-continue to use stairs as tolerated"~"DRIVING RESTRICTIONS:"~"	-No driving for one month or until approved by your surgeon"~"WOUND CARE:"~"	-Shower daily. Use soap & water. "~"	-No lotions, creams or powders on incision area."~"DIET:"~"	-continue a low fat/low cholesterol diet."~"	-IF you are diabetic, continue carb controlled diet."~"CARDIAC REHAB:"~"	-Please make appointment to start in 5-6 weeks with your local hospital program. (See Cardiac Rehabilitation Discharge Booklet)."~"SPECIALTY INSTRUCTIONS: "~"	-Weigh yourself daily. Call your physician for any weight gain/loss of 3 lbs overnight or 5 lbs in one week."~"	-REPORT any clicking noise or uneven appearance of your sternum to your surgeon immediately."~"	-If you smoke, you are instructed to quit. The [place] smoking hotline phone number is [phone]"~"Referrals:"~"CT Transitional Care Nurse [Outside]"~"("~"The Cardiothoracic Transitional Care Nurse will call you to set up a visit in 1-2 days.)"~"[place],  [Family Provider] - "~"Garry Hunt MD [Active] - 10/10/24 1:40 pm"~"Guanaco Gunter MD [Active] - 10/03/24 2:30 pm"~"Additional Discharge Medication Instructions: xarelto 20mg changed to 15mg "~"Stop dexlansoprazole "~"stop famotidine changed to pantoprazole "~"stop isosorbide "~"stop losartan "~"stop nitroglycerin "~"Prescriptions:"~"New"~"  acetaminophen 325 mg Tablet "~"   650 mg PO Q4HPRN PRN (Reason: mild pain,headache,temp &gt;101F ) Qty: 1 0RF"~"  clopidogrel 75 mg Tablet "~"   75 mg PO DAILY Qty: 30 3RF"~"  pantoprazole 40 mg Tablet,Delayed Release (Dr/Ec) "~"   40 mg PO DAILY Qty: 30 3RF"~"  Xarelto 15 mg Tablet "~"   15 mg PO QPM Qty: 30 1RF"~"  Pt's Own Ins. Pump Aspart [Pt's Own Insulin Pump - Novolog]   "~"   See Rx Instructions  .ROUTE .COMPLEX Qty: 1 0RF"~"   Rx Instructions:"~"   use as instructed"~"  amoxicillin-pot clavulanate 500-125 mg tablet "~"   1 tab PO BID Qty: 7 0RF"~"  sennosides-docusate sodium 8.6-50 mg Tablet "~"   1 tab PO Q12 Qty: 20 1RF"~"Continued"~"  atorvastatin 40 mg Tablet "~"   40 mg PO DAILY "~"  sotalol 80 mg Tablet "~"   40 mg PO BID "~"  gabapentin 300 mg Capsule "~"   900 mg PO QHS "~"  ezetimibe 10 mg Tablet "~"   10 mg PO DAILY "~"  cholecalciferol (vitamin D3) 50 mcg (2,000 unit) Tablet "~"   50 mcg PO DAILY "~"  potassium chloride 20 mEq Tablet,Er Particles/Crystals "~"   20 meq PO DAILY Qty: 10 1RF"~"  furosemide 20 mg Tablet "~"   20 mg PO BID Qty: 20 1RF"~"Discontinued"~"  isosorbide mononitrate 60 mg Tablet Extended Release 24 Hr "~"   120 mg PO DAILY "~"   Rx Instructions:"~"   HOLD for SBP below 110 andf HR below 60 "~"  famotidine 20 mg Tablet "~"   20 mg PO HS "~"  losartan 25 mg Tablet "~"   25 mg PO DAILY "~"   Rx Instructions:"~"   HOLD for SBP below 100 "~"  nitroglycerin 0.4 mg Tablet, Sublingual "~"   0.4 mg SUBLINGUAL Q5M PRN (Reason: chest pain ) "~"   Rx Instructions:"~"   Sublingual every 5 mins as needed for chest tightness (repeat x2 if needed and call MD if symptoms not relieved) "~"  dexlansoprazole 60 mg Capsule,Biphase Delayed Releas "~"   60 mg PO DAILY "~"  Xarelto 20 mg Tablet "~"   20 mg PO QPM "~"Discharge Orders:"~"Discharge Patient  (As Directed); Ordered 09/01/24"~"   Ordered By:  Jay Anderson"~"Care Plan Goals"~"Care Plan Goals:"~"Problem: Readiness for enhanced knowledge related to diagnosis and treatment plan"~"Goal: Understand your diagnosis and treatment plan needs, including medications if applicable."~"Instructions: Know your diagnosis, underlying causes and treatment plan options, including medications if applicable. Consult with your health care team to learn about your diagnosis and treatment plan, including medications if applicable."~"Discharge Date and Time"~"Discharge Date/Time: 09/01/24 14:15"~"Print Language: ENGLISH"

## 2024-09-01 NOTE — W.PN.CT
"Today's Communication / Plan"~"-"~"-: "~"Plan:"~"-No major issues overnight. Hemodynamically and neurologically intact"~"-Cont. Plavix and Xarelto, ASA d/c'd"~"-Refusing Farxiga until she speaks to primary Cardiologist"~"-Using own insulin pump. Diabetes management following"~"-Diuresing well, will switch from Bumex to Lasix "~"-Thrombocytopenia has resolved: 94-&gt;112-&gt;134-&gt;166K"~"-U/A suspicious for uti, f/u culture"~"-Cont. current meds (Plavix, Lipitor, Zetia, Sotalol 40 bid, Protonix; resumed Xarelto)"~"-Encourage use of IS"~"-OOB into chair/Ambulate"~"-Home today"~"Assessment / Plan"~"-"~"-: "~"Assessment:"~"- Standard sternotomy with aortic and right atrial cannulation/CABG x 3 (In situ LIMA to distal LAD, Ao to RSVG to proximal diagonal, Ao to RSVG to distal RCA)/Endoscopic harvest of bilateral lower extremities ultimately using the left lower "~"extremity vein/Surgical aortic valve replacement (21 mm bioprosthesis)/Modified posterior wall isolation, left atrial maze using RF ablation/Left atrial appendage exclusion (35 mm clip)/Debridement of significant calcifications around the ostium of "~"the left and right coronary, by Dr. Gunter 8/26/24, pod#6"~"-Multivessel coronary artery disease status post multiple PCI and stenting with in-stent restenosis for previous myocardial infarction"~"-Moderate aortic valve stenosis with severe mitral annular calcification and calcified root"~"-LVEF 60-65 preop, 70-75% postop per intraop GEORGETTE"~"-Hx MI/CAD S/P PCI with VANESA to LAD in June 2024"~"-[place]"~"-T2DM since age of 19, on insulin pump with hgb A1C of  7.2         "~"-Diabetic neuropathy       "~"-PAF S/P ablation, 2021"~"-PSVT S/P ablation, 2021"~"-Loop recorder on CXR"~"-HTN"~"-HLD"~"-Class 2 obesity (BMI 37.6)"~"-TONO (not on CPAP/BIPAP)"~"-Preop E.Coli UTI (treated with Augmentin)"~"-S/P b/l cataracts"~"-S/p Repair of retinal detachment, 2021"~"-S/P b/l carpal tunnel "~"-S/p Cholecystectomy "~"-S/P Appendectomy"~"-S/P Left TKA "~"-S/p Release of trigger finger "~"-S/P Gissell-en-Y gastric bypass surgery noted on CT"~"-Acute postop blood loss/Anemia (received 2u PRBC's intraop and 1u PRBC postop)"~"-Acute postop thrombocytopenia (transfused 1 {5pk} plts)"~"-Acute postop atelectasis/pleural effusion"~"-Acute postop hypovolemia with subsequent hypervolemia"~"-GRAY"~"-Acute postop small right apical pneumothorax"~"Discussed patient care with: Cardiology, Nursing, Respiratory Therapy, Pharmacy and Care Team"~"Subjective"~"Procedure"~"-: "~"- Standard sternotomy with aortic and right atrial cannulation/CABG x 3 (In situ LIMA to distal LAD, Ao to RSVG to proximal diagonal, Ao to RSVG to distal RCA)/Endoscopic harvest of bilateral lower extremities ultimately using the left lower "~"extremity vein/Surgical aortic valve replacement (21 mm bioprosthesis)/Modified posterior wall isolation, left atrial maze using RF ablation/Left atrial appendage exclusion (35 mm clip)/Debridement of significant calcifications around the ostium of "~"the left and right coronary, by Dr. Gunter 8/26/24"~"-"~"-: "~"Date of Service:  September 1, 2024"~"Pt c/o mild incisional pain, otherwise feels well"~"Objective Data"~"-"~"-: "~"Lab Results"~"09/01/24 04:27 "~"09/01/24 04:27 "~"PT	 18.5 Sec (11.4-14.6)  H 	08/26/24  19:01    "~"INR	 1.56  	08/26/24  19:01    "~"APTT	 37.2 Sec (23.4-35.0)  H 	08/26/24  19:01    "~"Vital Signs"~"-: "~"Vital Signs"~"Temp	Pulse	Resp	BP	Pulse Ox"~" 97.9 F 	 66 	 14 	 128/74 	 96 "~" 09/01/24 04:24	 09/01/24 04:23	 09/01/24 04:24	 09/01/24 04:23	 09/01/24 04:24"~"CT Intake/Output/Weight"~"	08/31/24	08/31/24	09/01/24"~"	06:59	18:59	06:59"~"Intake Total		975 / 975	"~"Output Total	1200 / 2700	1125 / 1125	"~"Balance	-1200 / -2220	-150 / -150	"~"SaO2: 96 (RA)"~"Physical Exam"~"-"~"General: Awake, Oriented and AOx3"~"Cardiovascular: Regular rate & rhythm, No Murmurs, No Rub and No Gallop"~"Respiratory: Decreased Breath Sounds (at bases, otherwise clear)"~"Sternum: Stable"~"Incision: Clean, Dry, Intact and Dressing Intact"~"Extremities: Edema +1"~"Data Reviewed"~"-"~"Lab Results: Results Reviewed"~"Medications: Active Meds Reviewed"~"Chest X-Ray: Report Reviewed and Image Reviewed"~"ECG: Report Reviewed and Image Reviewed"